# Patient Record
Sex: FEMALE | Race: WHITE
[De-identification: names, ages, dates, MRNs, and addresses within clinical notes are randomized per-mention and may not be internally consistent; named-entity substitution may affect disease eponyms.]

---

## 2017-11-13 NOTE — HP
HISTORY AND PHYSICAL



CHIEF COMPLAINT:

Left shoulder pain.



HISTORY OF PRESENT ILLNESS:

The patient is an 82-year-old retired right-hand dominant female who presents with

progressive left shoulder pain secondary to osteoarthrosis, worsening over the past

several years.  She is having pain with any attempted use and at night.  She has tried

previous injections and medications without significant improvement.



PAST MEDICAL HISTORY:

Significant for diabetes, arthritis, reflux disease.



PAST SURGICAL HISTORY:

Significant for right total hip arthroplasty, lumbar fusion, and tonsillectomy.



CURRENT MEDICATIONS:

Prilosec, thyroxine, atorvastatin.



ALLERGIES:

PENICILLIN.



FAMILY HISTORY:

Significant for heart disease, COPD, and cancer.



SOCIAL HISTORY:

Negative for current tobacco or alcohol use.



REVIEW OF SYSTEMS:

A 16-point review of systems is otherwise reviewed and is noncontributory.



PHYSICAL EXAMINATION:

On examination, the patient is approximately 5 foot 10, 180 pounds, of mesomorphic

habitus.  HEENT exam is nonfocal.  Neck is supple.  On examination of her left

shoulder, she is tender about the anterior subacromial space.  She has moderate

crepitus.  Active range of motion, forward elevation 90 degrees, external rotation with

arm at side 20 degrees, internal rotation to L5.  Motor strength is 5- over 5 for

abduction and external rotation.  Impingement test is positive.  Her distal

neurovascular exam appears intact in the left upper extremity.



DIAGNOSTIC STUDIES:

X-rays to include AP, outlet, and axillary views of the left shoulder obtained in the

office show severe glenohumeral joint osteoarthrosis with maintained humeral head to

acromial distance.



IMPRESSION:

Left severe glenohumeral joint osteoarthrosis.



RECOMMENDATIONS:

I talked to the patient regarding her treatment options.  At this point, she is quite

symptomatic and limited because of pain despite conservative measures.  After thorough

discussion, she opts to proceed with surgery.  We will plan to proceed with left total

shoulder arthroplasty versus reverse total shoulder arthroplasty.  Risks and benefits

were discussed at length in layman's terms.  The patient underwent preoperative medical

evaluation by Dr. Briceno.





MMFRANCOL / SANDRAN: [id number] / Job#: [id number]

## 2017-11-14 ENCOUNTER — HOSPITAL ENCOUNTER (INPATIENT)
Dept: HOSPITAL 47 - 2ORMAIN | Age: 82
LOS: 2 days | Discharge: HOME HEALTH | DRG: 483 | End: 2017-11-16
Payer: MEDICARE

## 2017-11-14 VITALS — BODY MASS INDEX: 24 KG/M2

## 2017-11-14 DIAGNOSIS — E11.9: ICD-10-CM

## 2017-11-14 DIAGNOSIS — Z79.82: ICD-10-CM

## 2017-11-14 DIAGNOSIS — E78.5: ICD-10-CM

## 2017-11-14 DIAGNOSIS — Z79.899: ICD-10-CM

## 2017-11-14 DIAGNOSIS — M19.012: Primary | ICD-10-CM

## 2017-11-14 DIAGNOSIS — Z96.641: ICD-10-CM

## 2017-11-14 DIAGNOSIS — E03.9: ICD-10-CM

## 2017-11-14 DIAGNOSIS — Z82.5: ICD-10-CM

## 2017-11-14 DIAGNOSIS — Z88.0: ICD-10-CM

## 2017-11-14 DIAGNOSIS — I10: ICD-10-CM

## 2017-11-14 DIAGNOSIS — K21.9: ICD-10-CM

## 2017-11-14 LAB — GLUCOSE BLD-MCNC: 92 MG/DL (ref 75–99)

## 2017-11-14 PROCEDURE — 88300 SURGICAL PATH GROSS: CPT

## 2017-11-14 PROCEDURE — 0RRK0JZ REPLACEMENT OF LEFT SHOULDER JOINT WITH SYNTHETIC SUBSTITUTE, OPEN APPROACH: ICD-10-PCS

## 2017-11-14 PROCEDURE — 85025 COMPLETE CBC W/AUTO DIFF WBC: CPT

## 2017-11-14 PROCEDURE — 64415 NJX AA&/STRD BRCH PLXS IMG: CPT

## 2017-11-14 RX ADMIN — HYDROMORPHONE HYDROCHLORIDE ONE MG: 1 INJECTION, SOLUTION INTRAMUSCULAR; INTRAVENOUS; SUBCUTANEOUS at 17:20

## 2017-11-14 RX ADMIN — POTASSIUM CHLORIDE SCH MLS: 14.9 INJECTION, SOLUTION INTRAVENOUS at 09:35

## 2017-11-14 RX ADMIN — HYDROMORPHONE HYDROCHLORIDE ONE MG: 1 INJECTION, SOLUTION INTRAMUSCULAR; INTRAVENOUS; SUBCUTANEOUS at 16:02

## 2017-11-14 RX ADMIN — DEXTROSE SCH MLS/HR: 50 INJECTION, SOLUTION INTRAVENOUS at 19:12

## 2017-11-14 RX ADMIN — POTASSIUM CHLORIDE SCH MLS/HR: 14.9 INJECTION, SOLUTION INTRAVENOUS at 18:14

## 2017-11-14 NOTE — P.ONQ
Anesthesiology Proc Note - PNB





- Peripheral Nerve Block Performed


  ** Left Interscalene Single


Time Out Performed: Yes


Procedure Start Time: 09:42


Indication: Acute Post-Operative Pain, Analgesia


Specifically requested for management of pain by DrAlexis: Momo Engel


Sedation Type: Sedate with meaningful contact maintained


Preparation: Sterile Prep


Position: Supine


Catheter: None


Needle Types: Other (see comment) (Pajunk)


Needle Size: 50mm (2")


Needle Gauge: 21


Technique: Ultrasound


Injectate: Other (see comment) (12.5cc 0.5% ropivicaine + 12.5cc 2% lidocaine)


Adjunct: Epinephrine (see comment for dilution ratio) (1:200,000)


Blood Aspirated: No


Pain Paresthesia on Injection Noted: No


Resistance on Injection: Normal


Events: Uneventful and Well Tolerated

## 2017-11-14 NOTE — P.OP
Date of Procedure: 11/14/17


Preoperative Diagnosis: 


Severe left glenohumeral joint osteoarthrosis-primary


Postoperative Diagnosis: 


Same


Procedure(s) Performed: 


Left total shoulder arthroplasty


Implants: 


[employer] size 10 press-fit humeral stem, 52 mm x 18 mm eccentric humeral 

head, 48 mm pegged cemented glenoid component.


Anesthesia: GETA, regional


Surgeon: Momo Engel


Assistant #1: Mahendra Guzman


Estimated Blood Loss (ml): 100


Pathology: other (Humeral head)


Condition: stable


Disposition: PACU


Indications for Procedure: 


The patient's 82-year-old female who presents with progressive left shoulder 

pain secondary to osteoarthrosis despite extensive conservative measures.  A 

discussion of the risks and benefits of operative intervention versus continued 

conservative measures was made with patient.  She opted to proceed with 

surgery.  Operative risks to include infection, neurovascular injury, 

development blood clots, possible fracture, possible dislocation, possible 

component loosening and failure need for subsequent procedures was discussed.  

Informed consent was obtained.


Operative Findings: 


As below


Description of Procedure: 


The patient was brought to the operating room, and after induction of general 

anesthesia was placed into a beachchair position.  Bony prominences were 

appropriately padded.  The left upper extending was prepped and draped in 

normal fashion.  The bony outlines the acromion, distal clavicle, and coracoid 

process were outlined with a skin marker.  A deltopectoral incision was then 

made lateral to the coracoid process extending approximately 12 cm.  The skin 

was incised sharply.  Subcu tissues were divided bluntly.  Electrocautery was 

used for hemostasis.  The deltopectoral interval was identified and the 

cephalic vein gently retracted laterally with the deltoid.  Subdeltoid 

adhesions were bluntly dissected.  A self-retaining retractor was placed.  The 

upper one third of the pectoralis tendon was released with electrocautery to 

help facilitate exposure.  The clavipectoral fascia was opened and the 

conjoined tendon was gently retracted medially.  The bicipital groove was 

opened and the rotator cuff interval was opened.  The rotator cuff appeared to 

be intact.  Significant joint effusion was noted.  The biceps was tenotomized 

and allowed to retract distally.  The lesser tuberosity was osteotomized with a 

small sagittal saw.  This was tagged with #2 Ethibond.  The capsule was 

released from the anterior humeral neck under direct visualization.  The 

humeral head was fully exposed.  The inferior osteophytes and loose bodies were 

carefully removed.  The head was gently dislocated.  A starting hole was made 

in line with the humeral shaft and bicipital groove.  The canal was then reamed 

by hand up to size 10.  There is good distal fit and chatter.  The cutting 

guide was then placed.  I planned on 30 of retroversion.  I planned on 

resection at the rotator cuff insertion.  The cutting block was pinned in 

place.  The humeral head cut was then made.  The inferior osteophytes were 

carefully removed flush with the native cortical bone.  A protecting plate was 

placed along the cut surface.  Attention was then paid towards preparing the 

glenoid.  Retractors were placed anterior and posteriorly.  The labral tissue 

was debrided from a 6:00 to 12 o'clock position anteriorly.  The posterior 

labrum was also released.  There was adequate glenoid exposure at this point.  

A guidepin was placed in the central portion of the glenoid with the alignment 

guide.  The glenoid was reamed out a bleeding bony surface with a 48 mm reamer.

  It sized most appropriate size 48 mm.  The central pedicle was drilled.  The 

peripheral peg holes were drilled with the alignment guide.  The trial 48 mm 

central peg glenoid was placed and was fully seated.  There was good anterior 

to posterior in addition to a superior to inferior fit.  The trial component 

was removed.  Pulsatile lavage was utilized.  The bony surfaces were then 

dried.  The peripheral peg holes were pressurized with cement and excess cement 

was removed.  The central peg on the 48 mm component was bone grafted.  This 

was then inserted and fully seated.  After the cement had sufficiently hardened

, attention was then paid towards preparing the proximal humerus.  The 

appropriate broach was placed in 30 of retroversion and was fully seated.  

There is good rotational stability.  A trial 52 x 18 mm eccentric head was 

placed in the appropriate alignment.  The shoulder was gently reduced.  It was 

taken through range of motion.  I felt I was stable in flexion and extension 

with internal and external rotation.  The trial components were then removed.  

A #2 Ethibond placed lateral to the bicipital groove for reattachment of the 

lesser tuberosity.  The final size 10 press-fit humeral stem was inserted in 30

 of retroversion and was fully seated.  Again there was good rotational 

stability.  Pulsatile lavage was again utilized.  The 52 mm x 18 mm eccentric 

head was placed in the appropriate rotation to restore humeral height.  This 

was gently impacted.  The shoulder again was reduced.  Again it was stable in 

flexion and extension with internal and external rotation.  I felt there was 

adequate restoration of soft tissue tension.  Pulsatile lavage was again 

utilized.  The rotator interval was closed with #2 Ethibond suture.  The lesser 

tuberosity was reattached with #2 Ethibond suture.  The deltopectoral was 

closed with interrupted 2-0 Vicryl sutures.  The subcu tissues reapproximated 

interrupted 2-0 Vicryl sutures.  The skin was reapproximated with 3-0 

subcuticular Prolene suture.  Mastisol and Steri-Strips were applied.  A 

sterile dressing was applied in addition to a sling.  The patient was awoken 

from general anesthesia and transferred to recovery room in good condition.  

Blood loss was estimated at 100 mL.  No complications were incurred.  Sponge 

and needle counts were correct at the end the case.

## 2017-11-14 NOTE — XR
EXAMINATION TYPE: XR shoulder limited LT

 

DATE OF EXAM: 11/14/2017

 

CLINICAL HISTORY: Status post total left shoulder arthroplasty

 

TECHNIQUE: Single frontal view of left shoulder is obtained.

 

COMPARISON: None. 

 

FINDINGS: Metallic hardware from left shoulder arthroplasty is felt satisfactory position. Subcutaneo
us gas surrounds visualized portion of proximal humerus. Note is made of partial visualization of lef
t midlung opacity could reflect atelectasis and/or infiltrate. This could also reflect fluid tracking
 into fissure.

 

IMPRESSION:: Metallic hardware from left shoulder arthroplasty is satisfactory in position. Left midl
gregorio opacity could reflect pleural effusion or atelectasis and/or infiltrate. Consider chest x-ray cor
relation.

## 2017-11-15 VITALS — RESPIRATION RATE: 16 BRPM

## 2017-11-15 LAB
BASOPHILS # BLD AUTO: 0 K/UL (ref 0–0.2)
BASOPHILS NFR BLD AUTO: 0 %
CH: 30
CHCM: 31.2
EOSINOPHIL # BLD AUTO: 0 K/UL (ref 0–0.7)
EOSINOPHIL NFR BLD AUTO: 0 %
ERYTHROCYTE [DISTWIDTH] IN BLOOD BY AUTOMATED COUNT: 4.12 M/UL (ref 3.8–5.4)
ERYTHROCYTE [DISTWIDTH] IN BLOOD: 13.8 % (ref 11.5–15.5)
HCT VFR BLD AUTO: 39.8 % (ref 34–46)
HDW: 2.25
HGB BLD-MCNC: 12.2 GM/DL (ref 11.4–16)
LUC NFR BLD AUTO: 1 %
LYMPHOCYTES # SPEC AUTO: 0.9 K/UL (ref 1–4.8)
LYMPHOCYTES NFR SPEC AUTO: 8 %
MCH RBC QN AUTO: 29.5 PG (ref 25–35)
MCHC RBC AUTO-ENTMCNC: 30.5 G/DL (ref 31–37)
MCV RBC AUTO: 96.7 FL (ref 80–100)
MONOCYTES # BLD AUTO: 0.8 K/UL (ref 0–1)
MONOCYTES NFR BLD AUTO: 6 %
NEUTROPHILS # BLD AUTO: 10.1 K/UL (ref 1.3–7.7)
NEUTROPHILS NFR BLD AUTO: 85 %
WBC # BLD AUTO: 0.14 10*3/UL
WBC # BLD AUTO: 11.9 K/UL (ref 3.8–10.6)
WBC (PEROX): 12.31

## 2017-11-15 RX ADMIN — PANTOPRAZOLE SODIUM SCH MG: 40 INJECTION, POWDER, FOR SOLUTION INTRAVENOUS at 21:20

## 2017-11-15 RX ADMIN — DEXTROSE SCH: 50 INJECTION, SOLUTION INTRAVENOUS at 03:44

## 2017-11-15 RX ADMIN — THERA TABS SCH: TAB at 15:00

## 2017-11-15 RX ADMIN — CALCIUM CARBONATE (ANTACID) CHEW TAB 500 MG SCH MG: 500 CHEW TAB at 08:18

## 2017-11-15 RX ADMIN — POTASSIUM CHLORIDE SCH: 14.9 INJECTION, SOLUTION INTRAVENOUS at 08:19

## 2017-11-15 RX ADMIN — LEVOTHYROXINE SODIUM SCH MCG: 50 TABLET ORAL at 05:11

## 2017-11-15 NOTE — P.DS
Providers


Date of admission: 


11/14/17 08:55





Expected date of discharge: 11/15/17


Attending physician: 


Momo Engel





Primary care physician: 


Bhavya Colbybagh





[facility] Course: 





Date of admission: 11/14/2017


Date of discharge: 11/15/2017





Admission diagnosis: Status post left total shoulder arthroplasty


Discharge diagnosis: Same





Attending physician: Dr. Engel





Surgical procedures: Left total shoulder arthroplasty





Brief history: Patient is a 82-year-old female with a history of progressive 

primary left shoulder osteoarthritis.  At this point patient has failed 

conservative treatment measures and has opted to proceed with a elective left 

total shoulder arthroplasty.





Hospital course: Details of patient's surgery can be found in operative report.

  Patient tolerated the procedure well and was subsequently transported to 

orthopedic floor.  Patient's orthopeidc and medical care was provided daily. 

Patient had daily laboratory tests performed for evaluation of overall blood 

counts.  Patient had daily physical therapy to include strengthening range of 

motion as well as education with walker ambulation. Patient was treated with 

Lovenox for their postoperative DVT prophylaxis during their inpatient stay.  

Patient was noted to have a relatively uneventful postoperative course.  

Patient reported satisfactory pain control with oral pain medications by 

postoperative day 0.  Patient showed satisfactory progress with physical 

therapy.  Patient moved steadily through the program and had no difficulty 

meeting the goals by postoperative day 0.  Given patient's otherwise 

satisfactory course and having met physical therapy goals, plan is to discharge 

patient home on postoperative day 0.





Discharge condition/disposition: Patient will be discharged home in stable 

condition.





Discharge medications: Instructions are given on resumption of patient's normal 

daily medications per primary care recommendation, in addition patient will be 

prescribed tramadol 50 mg, aspirin 325 mg.





Discharge instructions:


1.  Wound care and infection precautions, keep incision dry and covered while 

showering, no lotions, creams, moisturizers. No soaking, tubs, pools, hottubs. 

Do not scrub over the incision.


2.  Utilize arm sling, pendular exercises 2-3 times a day


3.  Ice and elevate when necessary.  Do not exceed 20 minutes per hour with ice 

pack.


4.  Utilize compression sleeve until seen at first follow up appointment.


5.  Visiting nursing care.


6.  Pain meds and anticoagulants per prescription.


7.  Pain medication has potential to cause constipation. Increase oral fluid 

and fiber intake. Contact primary care provider if you have not had a bowel 

movement within 48 hours after discharge


8.  No anti-inflammatory medication until discussed at first post operative 

visit, this including Motrin, Aleve, Mobic, Diclofenac


9.  Follow up in office at 2 weeks postop with Benja Guzman PA-C


10. Follow up with your primary care doctor 7-10 days after discharge.


11. Contact [facility] with any questions, [phone].








Procedures: 





Left total shoulder arthroplasty


Patient Condition at Discharge: Good





Plan - Discharge Summary


Discharge Rx Participant: Yes


New Discharge Prescriptions: 


New


   Aspirin 325 mg PO BID #60 tab


   traMADol HCl [Ultram] 50 mg PO Q6H PRN #40 tab


     PRN Reason: Pain





No Action


   Omeprazole [PriLOSEC] 20 mg PO AC-BRKFST


   Levothyroxine Sodium [Synthroid] 50 mcg PO DAILY


   Atorvastatin [Lipitor] 20 mg PO HS


   Multivitamins, Thera [Multivitamin (formulary)] 1 tab PO DAILY


   Omega-3   1 cap PO DAILY


   Calcium Carbonate [Calcium] 1,200 mg PO DAILY


   Randolph 3,250 tab PO DAILY


Discharge Medication List





Randolph 3,250 tab PO DAILY 11/08/17 [History]


Atorvastatin [Lipitor] 20 mg PO HS 11/08/17 [History]


Calcium Carbonate [Calcium] 1,200 mg PO DAILY 11/08/17 [History]


Levothyroxine Sodium [Synthroid] 50 mcg PO DAILY 11/08/17 [History]


Multivitamins, Thera [Multivitamin (formulary)] 1 tab PO DAILY 11/08/17 [History

]


Omega-3   1 cap PO DAILY 11/08/17 [History]


Omeprazole [PriLOSEC] 20 mg PO AC-BRKFST 11/08/17 [History]


Aspirin 325 mg PO BID #60 tab 11/15/17 [Rx]


traMADol HCl [Ultram] 50 mg PO Q6H PRN #40 tab 11/15/17 [Rx]








Follow up Appointment(s)/Referral(s): 


[facility], [NON-STAFF] - 


Mahendra Guzman PAC [PHYSICIAN ASSISTANT] - 2 Weeks


Activity/Diet/Wound Care/Special Instructions: 


Orthopedic Discharge Instructions:


1.  Wound care and infection precautions, [keep incision dry and covered while 

showering], no lotions, creams, moisturizers. No soaking, pools, hot tubs. Do 

not scrub over incision.


2.  Utilize arm sling, pendular exercises 2-3 times a day with


3.  Ice and elevate when necessary.  Do not exceed 20 minutes per hour with ice 

pack.


4.  Utilize compression sleeve until seen at first follow up appointment.


5.  Visiting nursing care.


6.  Pain meds and anticoagulants per prescription.


7.  Pain medication has potential to cause constipation. Increase oral fluid 

and fiber intake. Contact primary care provider if you have not had a bowel 

movement within 48 hours after discharge.


8.  No anti-inflammatory medication until discussed at first post operative 

visit, this including Motrin, Aleve, Mobic, Diclofenac.


9. Follow up in office at 2 weeks postop with Benja Guzman PA-C


10. Follow up with your primary care doctor 7-10 days after discharge.


11. Contact [facility] with any questions, [phone].


Discharge Disposition: HOME WITH HOME HEALTH SERVICES

## 2017-11-15 NOTE — P.PN
Subjective


Progress Note Date: 11/15/17


Principal diagnosis: 





Status post left total shoulder arthroplasty





Patient seen today resting in her hospital bed, he is ecchymosis present at 

bedside.  Patient is doing very well, her pain is controlled.  She denies any 

headaches, lightheadedness, chest pain or shortness of breath.





Objective





- Vital Signs


Vital signs: 


 Vital Signs











Temp  97.5 F L  11/15/17 07:00


 


Pulse  69   11/15/17 08:00


 


Resp  17   11/15/17 08:00


 


BP  139/82   11/15/17 07:00


 


Pulse Ox  93 L  11/15/17 07:00








 Intake & Output











 11/14/17 11/15/17 11/15/17





 18:59 06:59 18:59


 


Intake Total 1807 450 


 


Output Total 400 400 300


 


Balance 1407 50 -300


 


Weight 77.111 kg  


 


Intake:   


 


  IV 1807  


 


  Intake, IV Titration  450 





  Amount   


 


    Clindamycin 900 mg In  50 





    Dextrose 5% in Water 50   





    ml @ 100 mls/hr IVPB Q8H   





    DAVIS Rx#:[id number]   


 


    Lactated Ringers 1,000 ml  400 





    @ 50 mls/hr IV .Q20H DAVIS   





    Rx#:[id number]   


 


Output:   


 


  Urine 300 400 300


 


    Uretheral ([place])   300


 


  Estimated Blood Loss 100  


 


Other:   


 


  Voiding Method Indwelling Catheter Indwelling Catheter Indwelling Catheter














- Exam





Left upper extremity:


Incision is clean, dry, and intact.  Sensory exam to light touch throughout the 

extremities intact.  She is able to wiggle all the fingers, flexion and 

extension and the wrist are intact. 





- Labs


CBC & Chem 7: 


 11/15/17 06:13





Labs: 


 Abnormal Lab Results - Last 24 Hours (Table)











  11/15/17 Range/Units





  06:13 


 


WBC  11.9 H  (3.8-10.6)  k/uL


 


MCHC  30.5 L  (31.0-37.0)  g/dL


 


Neutrophils #  10.1 H  (1.3-7.7)  k/uL


 


Lymphocytes #  0.9 L  (1.0-4.8)  k/uL














Assessment and Plan


Plan: 





Assessment:


1.  Postop day #1 status post left total shoulder arthroplasty





Plan:


1.  Pain control, continue supportive oral medication


2.  GI and DVT prophylaxis, aspirin 325 mg twice a day


3.  Wound care instructions were discussed with patient


4.  Pendular exercises at home


5.  Medical recommendations


6.  Discharge planning: Patient will be discharged home today


Time with Patient: Less than 30
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
## 2017-11-16 VITALS — DIASTOLIC BLOOD PRESSURE: 71 MMHG | TEMPERATURE: 98.4 F | SYSTOLIC BLOOD PRESSURE: 127 MMHG

## 2017-11-16 VITALS — HEART RATE: 69 BPM

## 2017-11-16 LAB
BASOPHILS # BLD AUTO: 0 K/UL (ref 0–0.2)
BASOPHILS NFR BLD AUTO: 0 %
CH: 29
CHCM: 31.1
EOSINOPHIL # BLD AUTO: 0.1 K/UL (ref 0–0.7)
EOSINOPHIL NFR BLD AUTO: 1 %
ERYTHROCYTE [DISTWIDTH] IN BLOOD BY AUTOMATED COUNT: 3.56 M/UL (ref 3.8–5.4)
ERYTHROCYTE [DISTWIDTH] IN BLOOD: 14.8 % (ref 11.5–15.5)
HCT VFR BLD AUTO: 33.4 % (ref 34–46)
HDW: 2.27
HGB BLD-MCNC: 10.8 GM/DL (ref 11.4–16)
LUC NFR BLD AUTO: 1 %
LYMPHOCYTES # SPEC AUTO: 1.2 K/UL (ref 1–4.8)
LYMPHOCYTES NFR SPEC AUTO: 12 %
MCH RBC QN AUTO: 30.5 PG (ref 25–35)
MCHC RBC AUTO-ENTMCNC: 32.4 G/DL (ref 31–37)
MCV RBC AUTO: 94 FL (ref 80–100)
MONOCYTES # BLD AUTO: 0.9 K/UL (ref 0–1)
MONOCYTES NFR BLD AUTO: 9 %
NEUTROPHILS # BLD AUTO: 7.4 K/UL (ref 1.3–7.7)
NEUTROPHILS NFR BLD AUTO: 77 %
WBC # BLD AUTO: 0.13 10*3/UL
WBC # BLD AUTO: 9.7 K/UL (ref 3.8–10.6)
WBC (PEROX): 10.22

## 2017-11-16 RX ADMIN — THERA TABS SCH: TAB at 13:53

## 2017-11-16 RX ADMIN — CALCIUM CARBONATE (ANTACID) CHEW TAB 500 MG SCH: 500 CHEW TAB at 09:37

## 2017-11-16 RX ADMIN — LEVOTHYROXINE SODIUM SCH: 50 TABLET ORAL at 04:37

## 2017-11-16 RX ADMIN — PANTOPRAZOLE SODIUM SCH MG: 40 INJECTION, POWDER, FOR SOLUTION INTRAVENOUS at 08:43

## 2017-11-16 RX ADMIN — POTASSIUM CHLORIDE SCH: 14.9 INJECTION, SOLUTION INTRAVENOUS at 04:36

## 2017-11-16 NOTE — P.PN
Subjective


Progress Note Date: 11/16/17


Principal diagnosis: 





Status post left total shoulder arthroplasty





Patient seen today resting in her hospital bed, she appears comfortable.  

Patient did stay last night due to concerns from internal medicine and GI 

regarding the morning episode of emesis with dark material.  They mention if 

she had no further episodes she will be discharged home today.  Patient states 

that she feels well at this time.





Objective





- Vital Signs


Vital signs: 


 Vital Signs











Temp  98.4 F   11/16/17 00:00


 


Pulse  69   11/16/17 08:00


 


Resp  16   11/16/17 08:00


 


BP  127/71   11/16/17 00:00


 


Pulse Ox  92 L  11/16/17 00:00








 Intake & Output











 11/15/17 11/16/17 11/16/17





 18:59 06:59 18:59


 


Output Total 500  


 


Balance -500  


 


Weight 77.111 kg  77.111 kg


 


Output:   


 


  Urine 300  


 


    Uretheral ([place]) 300  


 


  Emesis 200  


 


Other:   


 


  Voiding Method Indwelling Catheter  Toilet


 


  # Voids 2 4 


 


  # Bowel Movements  1 1














- Exam





Left upper extremity:


Incision is clean, dry, and intact.  Sensory exam to light touch throughout the 

extremities intact.  She is able to wiggle all the fingers, flexion and 

extension and the wrist are intact. 





- Labs


CBC & Chem 7: 


 11/16/17 06:35





Labs: 


 Abnormal Lab Results - Last 24 Hours (Table)











  11/16/17 Range/Units





  06:35 


 


RBC  3.56 L  (3.80-5.40)  m/uL


 


Hgb  10.8 L  (11.4-16.0)  gm/dL


 


Hct  33.4 L  (34.0-46.0)  %














Assessment and Plan


Plan: 





Assessment:


1.  Postop day #2 status post left total shoulder arthroplasty





Plan:


1.  Pain control, continue supportive oral medication


2.  GI and DVT prophylaxis, aspirin 325 mg twice a day


3.  Wound care instructions were discussed with patient


4.  Pendular exercises at home


5.  Medical recommendations


6.  Discharge planning: Patient will be discharged home today


Time with Patient: Less than 30

## 2017-11-16 NOTE — P.PN
Subjective


Progress Note Date: 11/16/17





Patient is status post left total shoulder arthroplasty.  She was initially was 

be discharged yesterday however, she had 2 episodes of vomiting black material.

  Patient was placed on Protonix and aspirin was held.  GI service was 

consulted.  Hemoglobin has dropped from 12.2-10.8.  Patient has been given 

regular breakfast this morning.  She's had no further episodes of vomiting.  

And if there is no recurrence of vomiting per nursing staff GI service 

recommended the patient could be discharged home if she tolerates diet.  

Patient is passing gas.  No abdominal pain.  No bowel movement.  No further 

nausea and vomiting.  No difficulty urinating.  Pain is controlled the left 

shoulder.





Objective





- Vital Signs


Vital signs: 


 Vital Signs











Temp  98.4 F   11/16/17 00:00


 


Pulse  69   11/16/17 08:00


 


Resp  16   11/16/17 08:00


 


BP  127/71   11/16/17 00:00


 


Pulse Ox  92 L  11/16/17 00:00








 Intake & Output











 11/15/17 11/16/17 11/16/17





 18:59 06:59 18:59


 


Output Total 500  


 


Balance -500  


 


Weight 77.111 kg  77.111 kg


 


Output:   


 


  Urine 300  


 


    Uretheral ([place]) 300  


 


  Emesis 200  


 


Other:   


 


  Voiding Method Indwelling Catheter  Toilet


 


  # Voids 2 4 


 


  # Bowel Movements  1 1














- Exam





Head normocephalic


Neck supple


Lungs clear to auscultation bilaterally no wheezing or crackles


Heart regular rate and rhythm S1-S2, no rub or gallop


Abdomen is soft nontender nondistended positive bowel sounds no 

hepatosplenomegaly


Extremities no edema.  Left arm dressing clean dry and it tapped for the left 

shoulder.  Arm is in sling.  +2 radial pulse.


Neuro alert and orientated to 3





- Labs


CBC & Chem 7: 


 11/16/17 06:35





Labs: 


 Abnormal Lab Results - Last 24 Hours (Table)











  11/16/17 Range/Units





  06:35 


 


RBC  3.56 L  (3.80-5.40)  m/uL


 


Hgb  10.8 L  (11.4-16.0)  gm/dL


 


Hct  33.4 L  (34.0-46.0)  %














Assessment and Plan


Assessment: 





#1 left shoulder osteoarthritis status post total shoulder arthroplasty.


#2 episodes of vomiting of black material twice today, at this time will hold 

Lovenox withhold ASA Will consult gastroenterology for possible EGD if those 

episodes continue, patient was placed on Protonix 40 mg IV twice daily.  

Patient evaluated by GI service.  The plan is to see if patient tolerates diet 

before discharge home.  At this point there is no EGD scheduled


#3 underlying history of hypothyroidism continue with Synthroid 50 g daily


#4 underlying history of hyperlipidemia maintained on Lipitor continue


Will follow during this admission for medical management





Anticipating discharge home if patient tolerates diet without any further 

episodes of vomiting and is cleared by GI service.





I performed an examination of the patient and discussed their management with 

the physician Assistant.  I have reviewed the Physician Assistant's notes and 

agree with the documented findings and plan of care

## 2017-11-16 NOTE — CONS
CONSULTATION



DATE OF SERVICE:

11/16/2017



REASON FOR CONSULTATION:

Coffee-grounds emesis.



HISTORY OF PRESENT ILLNESS:

The patient is an 82-year-old pleasant white female who underwent an elective left

shoulder surgery 2 days ago and following the surgery, had some epigastric discomfort

and had two episodes of coffee-grounds emesis.  We are hence consulted in regards to

this issue.  Her surgery was 2 days ago and she had two episodes of emesis yesterday.

Overnight, she did not have any further episodes.  This morning she is feeling good.

No abdominal pain.  No nausea, vomiting.  She denies any prior history of peptic ulcer

disease.  No recent NSAID use.  She states she is hungry and wants to eat now.



PAST MEDICAL HISTORY:

Significant for GERD, hypertension, hyperlipidemia, hypothyroidism.



PAST SURGICAL HISTORY:

Recent left shoulder surgery 2 days ago, history of total right hip, lesion of the

back.



MEDICATIONS:

At home include multivitamin, Synthroid, Lipitor, calcium, tramadol, aspirin, Prilosec.



SOCIAL HISTORY:

No smoking or alcohol use.



ALLERGIES:

HYDROCODONE, PENICILLIN, LATEX.



FAMILY HISTORY:

Brother has DVT.  Sister had some kind of a cancer.



REVIEW OF SYSTEMS:

CARDIOPULMONARY:  No chest pain, shortness of breath.

GENITOURINARY:  No dysuria or hematuria.

MUSCULOSKELETAL:  Unremarkable.

SKIN:  Unremarkable.

ENDOCRINE: Unremarkable.

PSYCHIATRIC:  Unremarkable.

NEUROLOGY:  Unremarkable.

ENT:  Vision unremarkable.

CONSTITUTIONAL:  No recent weight loss.  No fever, chills, night sweats.



PHYSICAL EXAMINATION:

She appears comfortable, in no apparent distress.

Vital signs are stable.  Blood pressure is 127/71, pulse 86, temperature 98.4.

HEENT examination is remarkable, conjunctivae or pink, sclera nonicteric. Oral cavity

no lesions.

NECK:  No JVD or lymph node enlargement.  Chest was clear to auscultation.

HEART:  Regular rate and rhythm.

Abdomen is soft.  Bowel sounds are positive.  No organomegaly.

EXTREMITIES:  No pedal edema.  Left shoulder in cast.

NEURO: Alert and oriented x3.  No focal deficits.



LABS:

From yesterday, hemoglobin was 12.2, this morning is 10.8.  WBC 11.9 and platelets are

normal.



IMPRESSION:

This is a lady who presents to the hospital for an elective left shoulder surgery,

which was done 2 days ago and yesterday had some epigastric discomfort and had a small

amount of coffee-grounds emesis x1.  Hemoglobin remained stable.  She presently is

symptom-free for the last 24 hours.  No prior history of peptic ulcer disease,

presently hemodynamically stable and no active bleeding.



RECOMMENDATIONS:

1. Continue with Protonix 40 mg q.12 hours.

2. Advance diet as tolerated.

3. If the patient is doing well, she can be discharged home with outpatient and

    continue with Protonix 40 mg twice daily for 1 month and then decrease it to once

    daily.  If she has further symptoms, she can follow up in the office.

Thank you for this consultation.





MMFRANCOL / SANDRAN: [id number] / Job#: [id number]

## 2020-06-30 ENCOUNTER — HOSPITAL ENCOUNTER (OUTPATIENT)
Dept: HOSPITAL 47 - LABPAT | Age: 85
Discharge: HOME | End: 2020-06-30
Attending: ORTHOPAEDIC SURGERY
Payer: MEDICARE

## 2020-06-30 DIAGNOSIS — Z01.812: Primary | ICD-10-CM

## 2020-06-30 PROCEDURE — 87070 CULTURE OTHR SPECIMN AEROBIC: CPT

## 2020-07-06 NOTE — HP
HISTORY AND PHYSICAL



CHIEF COMPLAINT:

Right shoulder pain.



HISTORY OF PRESENT ILLNESS:

Patient is an 85-year-old, right-hand dominant, retired female who presents with

progressive right shoulder pain worsening over the past 1-2 years.  She notes

significant stiffness along with pain with any attempted use.  She is also having

significant night symptoms.  She has tried medications in addition to therapy and

injections without much relief.



PAST MEDICAL HISTORY:

Significant for reflux disease, hypothyroidism, and type 2 diabetes along with

arthritis.



PAST SURGICAL HISTORY:

Significant for spinal fusion, total hip arthroplasty, tonsillectomy, and left total

shoulder arthroplasty.



CURRENT MEDICATIONS:

Levothyroxine and omeprazole.



She notes allergies to PENICILLIN.



FAMILY HISTORY:

Significant for cancer and heart disease.



SOCIAL HISTORY:

Negative for current tobacco or alcohol use.



16 POINT REVIEW OF SYSTEMS:

Otherwise reviewed and is noncontributory.



PHYSICAL EXAMINATION:

On examination, the patient is approximately 5 foot 10, 180 pounds of mesomorphic

habitus.

HEENT:  Exam is nonfocal.  Neck is supple.



On examination of her right shoulder, she is tender about the anterior subacromial

space.  She has moderate subacromial crepitus.  Active range of motion, forward

elevation 60 degrees, external rotation with arms at side 15 degrees, internal rotation

to S1.  Motor strength is 5/5 for abduction and external rotation.  Passively, I am

able to forward elevate her to 110 degrees.  Impingement test, NEER test, and Speed

test are positive.  Her distal neurovascular exam appears intact otherwise in the right

upper extremity.



Three views of the right shoulder obtained in the office show severe glenohumeral joint

osteoarthrosis with bone-on-bone changes.  There is subchondral sclerosis.



IMPRESSION:

Right severe glenohumeral joint osteoarthrosis-symptomatic.



RECOMMENDATIONS:

I talked to the patient at length regarding her condition and treatment options.  At

this point, she is quite symptomatic and opts to proceed with surgery.  Will plan to

proceed with right total shoulder arthroplasty.  Risks and benefits were discussed at

length in layman's terms.  We will likely keep the patient for a 23-hour hold

postoperatively.





MADINA / SANDRAN: [id number] / Job#: [id number]

## 2020-07-07 ENCOUNTER — HOSPITAL ENCOUNTER (INPATIENT)
Dept: HOSPITAL 47 - 2ORMAIN | Age: 85
LOS: 1 days | Discharge: HOME HEALTH | DRG: 483 | End: 2020-07-08
Attending: ORTHOPAEDIC SURGERY | Admitting: ORTHOPAEDIC SURGERY
Payer: MEDICARE

## 2020-07-07 VITALS — BODY MASS INDEX: 23.7 KG/M2

## 2020-07-07 DIAGNOSIS — Z88.5: ICD-10-CM

## 2020-07-07 DIAGNOSIS — Z96.612: ICD-10-CM

## 2020-07-07 DIAGNOSIS — E11.9: ICD-10-CM

## 2020-07-07 DIAGNOSIS — Z80.9: ICD-10-CM

## 2020-07-07 DIAGNOSIS — Z98.890: ICD-10-CM

## 2020-07-07 DIAGNOSIS — Z88.0: ICD-10-CM

## 2020-07-07 DIAGNOSIS — Z91.040: ICD-10-CM

## 2020-07-07 DIAGNOSIS — Z79.890: ICD-10-CM

## 2020-07-07 DIAGNOSIS — Z90.89: ICD-10-CM

## 2020-07-07 DIAGNOSIS — E78.5: ICD-10-CM

## 2020-07-07 DIAGNOSIS — E03.9: ICD-10-CM

## 2020-07-07 DIAGNOSIS — Z98.1: ICD-10-CM

## 2020-07-07 DIAGNOSIS — K21.9: ICD-10-CM

## 2020-07-07 DIAGNOSIS — Z96.641: ICD-10-CM

## 2020-07-07 DIAGNOSIS — Z82.49: ICD-10-CM

## 2020-07-07 DIAGNOSIS — M19.011: Primary | ICD-10-CM

## 2020-07-07 LAB
GLUCOSE BLD-MCNC: 142 MG/DL (ref 75–99)
GLUCOSE BLD-MCNC: 92 MG/DL (ref 75–99)

## 2020-07-07 PROCEDURE — 94760 N-INVAS EAR/PLS OXIMETRY 1: CPT

## 2020-07-07 PROCEDURE — 85025 COMPLETE CBC W/AUTO DIFF WBC: CPT

## 2020-07-07 PROCEDURE — 0RRJ0JZ REPLACEMENT OF RIGHT SHOULDER JOINT WITH SYNTHETIC SUBSTITUTE, OPEN APPROACH: ICD-10-PCS

## 2020-07-07 PROCEDURE — 88300 SURGICAL PATH GROSS: CPT

## 2020-07-07 PROCEDURE — 64415 NJX AA&/STRD BRCH PLXS IMG: CPT

## 2020-07-07 PROCEDURE — 84132 ASSAY OF SERUM POTASSIUM: CPT

## 2020-07-07 PROCEDURE — 76942 ECHO GUIDE FOR BIOPSY: CPT

## 2020-07-07 RX ADMIN — POTASSIUM CHLORIDE SCH MLS: 14.9 INJECTION, SOLUTION INTRAVENOUS at 12:56

## 2020-07-07 RX ADMIN — HYDROMORPHONE HYDROCHLORIDE PRN MG: 1 INJECTION, SOLUTION INTRAMUSCULAR; INTRAVENOUS; SUBCUTANEOUS at 15:54

## 2020-07-07 RX ADMIN — HYDROMORPHONE HYDROCHLORIDE PRN MG: 1 INJECTION, SOLUTION INTRAMUSCULAR; INTRAVENOUS; SUBCUTANEOUS at 16:26

## 2020-07-07 NOTE — P.ANPRN
Procedure Note - Anesthesia





- Nerve Block Performed


  ** Right Interscalene Single


Time Out Performed: Yes ([id number])


Date of Procedure: 07/07/20


Procedure Start Time: 16:00


Procedure Stop Time: 16:09


Location of Patient: PreOp


Indication: Acute Post-Operative Pain, Requested by Surgeon


Specifically requested for management of pain by : Momo Engel


Sedation Type: Sedate with meaningful contact maintained


Preparation: Sterile Prep


Position: Supine


Catheter: None


Needle Types: Pajunk


Needle Gauge: 21


Ultrasound used to visualize needle placement: Yes


Ultrasound used to observe medication spread: Yes


Injectate: 0.5% Ropivacaine (see comment for volume) (20cc)


Blood Aspirated: No


Pain Paresthesia on Injection Noted: No


Resistance on Injection: Normal


Image Stored and Saved: Yes


Events: Uneventful and Well Tolerated

## 2020-07-07 NOTE — P.OP
Date of Procedure: 07/07/20


Preoperative Diagnosis: 


Right severe glenohumeral joint osteoarthrosis


Postoperative Diagnosis: 


Same


Procedure(s) Performed: 


Right total shoulder arthroplasty


Implants: 


[employer] size 14 press-fit humeral stem was size 14 body, 52 x 18 mm 

eccentric humeral head, 48 mm cemented pegged glenoid component.


Anesthesia: MARIELOS


Surgeon: Momo Engel


Assistant #1: Mahendra Guzman


Estimated Blood Loss (ml): 150


Pathology: other (Humeral head)


Condition: stable


Disposition: PACU


Indications for Procedure: 


The patient's an 85-year-old female who presents with progressive right shoulder

pain secondary osteoarthrosis despite conservative measures.  A discussion of 

the risks and benefits of operative intervention versus continued conservative 

measures was made with the patient.  She opted to proceed with surgery.  

Operative risks to include infection, neurovascular injury, development of blood

clots, possible fracture, possible component loosening and need for subsequent 

procedures was discussed.  Informed consent was obtained.


Operative Findings: 


As below


Description of Procedure: 


The patient was brought to the operating room, and after induction of general 

anesthesia was placed in the beachchair position.  The bony prominences were 

appropriately padded.  The right upper extremity was prepped and draped in 

normal fashion.  A deltopectoral incision was then made lateral to the coracoid 

process extending approximately 12 cm.  The skin was incised sharply.  

Subcutaneous tissues were divided bluntly.  Electrocautery was used for 

hemostasis.  The deltopectoral interval was identified and the cephalic vein 

gently retracted laterally with the deltoid.  Subdeltoid adhesions were bluntly 

dissected.  A self-retaining retractor was placed.  The clavipectoral fascia was

opened and the conjoined tendon gently retracted medially.  The upper one third 

of the pectoralis major was released to help facilitate exposure.  The biceps 

was identified and the sheath was opened.  The rotator interval was opened.  The

biceps was tenotomized and allowed to retract distally.  The lesser tuberosity 

osteotomy was performed with a small sagittal saw.  This completed with an 

osteotome.  The humeral head was then exposed releasing the capsule off the 

humeral neck.  The shoulder was gently dislocated.  A starting hole was made in 

the head in line with the humeral shaft.  The shaft was reamed by hand up to 14 

mm.  There is good distal chatter.  The cutting guide was placed planning on 8 

cut flush with the rotator cuff insertion and 30 of retroversion.  The cutting 

block was pinned in place.  The humeral head cut was then made.  This measured 

most appropriately at 52 x 18 mm.  Residual inferior osteophytes were carefully 

removed flush with the native cortical bone.  A posterior glenoid retractor was 

placed.  The glenoid was then exposed releasing the labrum from the 12 to 6  

o'clock position.  Residual labral tissue was removed.  The glenoid sized most 

appropriate [] mm.  A guidewire was then inserted planning on the appropriate 

version.  The glenoid was reamed down to a bleeding bony surface.  The central 

peg hole was drilled.  The alignment guide was placed in the peripheral peg 

holes drilled.  The trial size 48 mm glenoid was placed and was fully seated.  

There was good anterior to posterior and inferior to superior fit.  The trial 

component was removed.  Pulsatile lavage was utilized.  The bony surface was 

dried.  The peripheral peg holes were then pressurized with cement utilizing a 

syringe.  Excess cement was removed.  A central peg glenoid was then placed and 

was fully seated.  This was gently impacted.  This was held in place until the 

cement had sufficiently hardened.  Attention was then paid again towards 

preparing the proximal humerus.  The appropriate broach was placed in 30 of 

retroversion and was fully seated.  An eccentric 52 x 18 mm humeral head was 

placed.  The shoulder was gently reduced.  It was taken through a range of m

otion.  It was felt to be stable in flexion and extension with internal and 

external rotation.  I felt there was adequate restoration of soft tissue 

tension.  The shoulder was gently dislocated.  The trial components were then 

removed.  A #2 Ethibond was placed laterally for reattachment of the lesser 

tuberosity.  The humeral stem was inserted in 30 of retroversion and was fully 

seated.  There was good rotational stability.  The eccentric 52 x 18 mm humeral 

head was gently impacted.  The shoulder was then gently reduced and taken 

through range of motion and was felt to be stable.  Pulsatile lavage was 

utilized.  Lesser tuberosity was reattached utilizing #2 Ethibond suture.  The 

rotator interval was closed with #2 Ethibond suture.  She had minimal drainage 

at this point therefore a deep drain was not placed.  The deltopectoral interval

 was closed with interrupted 2-0 Vicryl sutures.  The subcu tissues were 

reapproximated with interrupted 2-0 Vicryl sutures.  The skin was reprepped with

 3-0 subcuticular Prolene suture.  Steri-Strips were applied.  A sterile 

dressing was applied in addition to a sling.  The patient was then awoken from 

general anesthesia and transferred to recovery room in good condition.  Blood 

loss was estimated at 150 mL.  No complications were incurred.  Sponge and 

needle counts were correct at the end the case.  Benja JACKSON assisted during 

the major components the case to include exposure, resection, implantation, and 

closure.
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
## 2020-07-07 NOTE — XR
EXAMINATION TYPE: XR shoulder limited RT

 

DATE OF EXAM: 7/7/2020

 

COMPARISON: 6/8/2020

 

HISTORY: Surgery

 

TECHNIQUE: Single view

 

FINDINGS: There is a right shoulder prosthesis. Components are in anatomic position.

 

IMPRESSION: No complicating process seen.

## 2020-07-08 LAB
BASOPHILS # BLD AUTO: 0 K/UL (ref 0–0.2)
BASOPHILS NFR BLD AUTO: 0 %
EOSINOPHIL # BLD AUTO: 0 K/UL (ref 0–0.7)
EOSINOPHIL NFR BLD AUTO: 0 %
ERYTHROCYTE [DISTWIDTH] IN BLOOD BY AUTOMATED COUNT: 3.74 M/UL (ref 3.8–5.4)
ERYTHROCYTE [DISTWIDTH] IN BLOOD: 15 % (ref 11.5–15.5)
GLUCOSE BLD-MCNC: 120 MG/DL (ref 75–99)
HCT VFR BLD AUTO: 33.4 % (ref 34–46)
HGB BLD-MCNC: 10.7 GM/DL (ref 11.4–16)
LYMPHOCYTES # SPEC AUTO: 1.4 K/UL (ref 1–4.8)
LYMPHOCYTES NFR SPEC AUTO: 12 %
MCH RBC QN AUTO: 28.7 PG (ref 25–35)
MCHC RBC AUTO-ENTMCNC: 32.1 G/DL (ref 31–37)
MCV RBC AUTO: 89.5 FL (ref 80–100)
MONOCYTES # BLD AUTO: 1 K/UL (ref 0–1)
MONOCYTES NFR BLD AUTO: 9 %
NEUTROPHILS # BLD AUTO: 9.4 K/UL (ref 1.3–7.7)
NEUTROPHILS NFR BLD AUTO: 78 %
PLATELET # BLD AUTO: 271 K/UL (ref 150–450)
WBC # BLD AUTO: 12.1 K/UL (ref 3.8–10.6)

## 2020-07-08 RX ADMIN — POTASSIUM CHLORIDE SCH: 14.9 INJECTION, SOLUTION INTRAVENOUS at 07:57

## 2020-07-08 NOTE — P.PN
Subjective


Progress Note Date: 07/08/20


Principal diagnosis: 





Status post right total shoulder arthroplasty





Patient evaluated today at bedside, she is resting comfortably.  Pain is well-

controlled.  She denies any chest pain or shortness of breath.





Objective





- Vital Signs


Vital signs: 


                                   Vital Signs











Temp  98.4 F   07/08/20 07:00


 


Pulse  84   07/08/20 07:00


 


Resp  16   07/08/20 07:00


 


BP  132/77   07/08/20 07:00


 


Pulse Ox  92 L  07/08/20 07:00








                                 Intake & Output











 07/07/20 07/08/20 07/08/20





 18:59 06:59 18:59


 


Intake Total 1401 1265 240


 


Output Total 150  


 


Balance 1251 1265 240


 


Weight 79.6 kg  


 


Intake:   


 


  IV 1401  


 


  Intake, IV Titration  825 





  Amount   


 


    Lactated Ringers 1,000 ml  725 





    @ 0 mls/hr IV .[facility]   





    ONE Rx#:[id number]   


 


    ceFAZolin 2 gm In Sodium  100 





    Chloride 0.9% 50 ml @ 100   





    mls/hr IVPB Q8H [facility] Rx#:   





    [id number]   


 


  Oral  440 240


 


Output:   


 


  Estimated Blood Loss 150  


 


Other:   


 


  Voiding Method  Bedside Commode Bedside Commode


 


  # Voids  2 














- Exam





Right upper extremity:





Postoperative bandages were removed, incision is clean, dry and intact.  Obvious

soft tissue swelling and ecchymosis present in the arm.  Sensory exam light 

touch is intact throughout the extremity, radial pulses 2+.





- Labs


CBC & Chem 7: 


                                 07/08/20 09:15





                                 07/07/20 12:54


Labs: 


                  Abnormal Lab Results - Last 24 Hours (Table)











  07/07/20 07/08/20 07/08/20 Range/Units





  21:01 07:16 09:15 


 


WBC    12.1 H  (3.8-10.6)  k/uL


 


RBC    3.74 L  (3.80-5.40)  m/uL


 


Hgb    10.7 L  (11.4-16.0)  gm/dL


 


Hct    33.4 L  (34.0-46.0)  %


 


Neutrophils #    9.4 H  (1.3-7.7)  k/uL


 


POC Glucose (mg/dL)  142 H  120 H   (75-99)  mg/dL














Assessment and Plan


Assessment: 





Status post right total shoulder arthroplasty


Plan: 





Pain control, plan for discharge, tramadol 50 mg





GI and DVT prophylaxis, aspirin 81 mg twice a day





Sling instructions were discussed along with activity restrictions





Wound care instructions discussed





Medical recommendations





Plan for discharge home today


Time with Patient: Less than 30

## 2020-07-08 NOTE — P.PN
Subjective


Progress Note Date: 07/08/20


Principal diagnosis: 





Medical management





Patient was seen and examined.  No acute events overnight.  Patient reports 

bruising around her left shoulder.  She reports resolution of her numbness.  She

denies any chest pain, shortness breath or palpitations.  No nausea or vomiting.

 No fever or chills.





Objective





- Vital Signs


Vital signs: 


                                   Vital Signs











Temp  98.4 F   07/08/20 07:00


 


Pulse  84   07/08/20 07:00


 


Resp  16   07/08/20 07:00


 


BP  132/77   07/08/20 07:00


 


Pulse Ox  92 L  07/08/20 07:00








                                 Intake & Output











 07/07/20 07/08/20 07/08/20





 18:59 06:59 18:59


 


Intake Total 1401 1265 240


 


Output Total 150  


 


Balance 1251 1265 240


 


Weight 79.6 kg  


 


Intake:   


 


  IV 1401  


 


  Intake, IV Titration  825 





  Amount   


 


    Lactated Ringers 1,000 ml  725 





    @ 0 mls/hr IV .[facility]   





    ONE Rx#:[id number]   


 


    ceFAZolin 2 gm In Sodium  100 





    Chloride 0.9% 50 ml @ 100   





    mls/hr IVPB Q8H [facility] Rx#:   





    [id number]   


 


  Oral  440 240


 


Output:   


 


  Estimated Blood Loss 150  


 


Other:   


 


  Voiding Method  Bedside Commode Bedside Commode


 


  # Voids  2 














- Exam





General: [non toxic], [no distress], [appears at stated age]


Derm: [warm], [dry]


Head: [atraumatic], [normocephalic], [symmetric]


Eyes: [EOMI], [no lid lag], [anicteric sclera]


Mouth: [no lip lesion], [mucus membranes moist]


Cardiovascular: [S1S2 reg], [no murmur], [positive posterior tibial pulse 

bilateral], 


Lungs: [CTA bilateral], [no rhonchi, no rales] , [no accessory muscle use]


Abdominal: [soft], [ nontender to palpation], [no guarding], [no appreciable 

organomegaly]


Ext: [no gross muscle atrophy], [no edema], [no contractures], right shoulder wr

apped in casted with dressing clean dry and intact, with 2+ radial pulse


Neuro: [ CN II-XI grossly intact], [no focal neuro deficits]


Psych: [Alert], [oriented], [appropriate affect] 





- Labs


CBC & Chem 7: 


                                 07/08/20 09:15





                                 07/07/20 12:54


Labs: 


                  Abnormal Lab Results - Last 24 Hours (Table)











  07/07/20 07/08/20 07/08/20 Range/Units





  21:01 07:16 09:15 


 


WBC    12.1 H  (3.8-10.6)  k/uL


 


RBC    3.74 L  (3.80-5.40)  m/uL


 


Hgb    10.7 L  (11.4-16.0)  gm/dL


 


Hct    33.4 L  (34.0-46.0)  %


 


Neutrophils #    9.4 H  (1.3-7.7)  k/uL


 


POC Glucose (mg/dL)  142 H  120 H   (75-99)  mg/dL














Assessment and Plan


Assessment: 





Leukocytosis


Hypothyroidism


GERD


Right shoulder numbness related to recent right shoulder total arthroplasty





Patient has a mild leukocytosis of 12.1 which is likely reactive from surgery.  

There are no signs of infection.  Continue Synthroid for hypothyroidism.  

Continue Prilosec for history of GERD.  Her numbness has resolved.  She is 

medically cleared for discharge home.

## 2020-07-08 NOTE — P.DS
Providers


Date of admission: 


07/07/20 12:15





Expected date of discharge: 07/08/20


Attending physician: 


Momo Engel





Consults: 





                                        





07/07/20 15:26


Consult Physician Routine 


   Consulting Provider: Terri Barron


   Consult Reason/Comments: Medical Management


   Do you want consulting provider notified?: Yes











Primary care physician: 


Stated None





Hospital Course: 





Date of admission: 07/07/2020


Date of discharge: 07/08/2020





Admission diagnosis: Status post right total shoulder arthroplasty


Discharge diagnosis: Same





Attending physician: Dr. Engel





Surgical procedures: Right total shoulder arthroplasty





Brief history: Patient is a 85-year-old female with a history of progressive 

primary right shoulder osteoarthritis.  At this point patient has failed 

conservative treatment measures and has opted to proceed with a elective right 

total shoulder arthroplasty.





Hospital course: Details of patient's surgery can be found in operative report. 

Patient tolerated the procedure well and was subsequently transported to 

orthopedic floor.  Patient's orthopeidc and medical care was provided daily. 

Patient had daily laboratory tests performed for evaluation of overall blood 

counts.  Patient had daily physical therapy to include strengthening range of 

motion as well as education with walker ambulation. Patient was treated with 

aspirin for their postoperative DVT prophylaxis during their inpatient stay.  

Patient was noted to have a relatively uneventful postoperative course.  Patient

reported satisfactory pain control with oral pain medications by postoperative 

day 0.  Patient showed satisfactory progress with physical therapy.  Patient 

moved steadily through the program and had no difficulty meeting the goals by 

postoperative day 1.  Given patient's otherwise satisfactory course and having 

met physical therapy goals, plan is to discharge patient home on postoperative 

day 1.





Discharge condition/disposition: Patient will be discharged home in stable 

condition.





Discharge medications: Instructions are given on resumption of patient's normal 

daily medications per primary care recommendation, in addition patient will be 

prescribed Tramadol 50 mg, aspirin 81 mg.





Discharge instructions:


1.  Wound care and infection precautions, keep incision dry and covered while 

showering], no lotions, creams, moisturizers. No soaking, tubs, pools, hottubs. 

Do not scrub over the incision.


2.  Utilize arm sling, keep incision clean and dry while showering


3.  Ice and elevate when necessary.  Do not exceed 20 minutes per hour with ice 

pack.


4.  Utilize compression sleeve until seen at first follow up appointment.


7.  Pain meds and anticoagulants per prescription.


8.  Pain medication has potential to cause constipation. Increase oral fluid and

fiber intake. Contact primary care provider if you have not had a bowel movement

within 48 hours after discharge


9.  No anti-inflammatory medication until discussed at first post operative 

visit, this including Motrin, Aleve, Mobic, Diclofenac


10.  Follow up in office at 2 weeks postop with Benja Guzman PA-C


11. Follow up with your primary care doctor 7-10 days after discharge.


12. Contact [facility] with any questions, [phone].











Procedures: 





Right total shoulder arthroplasty


Patient Condition at Discharge: Good





Plan - Discharge Summary


Discharge Rx Participant: Yes


New Discharge Prescriptions: 


New


   Aspirin [Adult Low Dose Aspirin EC] 81 mg PO BID #60 tablet.


   traMADol HCl [Ultram] 50 mg PO Q6H PRN #28 tab


     PRN Reason: Pain





No Action


   Levothyroxine Sodium [Synthroid] 50 mcg PO DAILY


   Omeprazole [PriLOSEC] 20 mg PO DAILY


Discharge Medication List





Levothyroxine Sodium [Synthroid] 50 mcg PO DAILY 11/08/17 [History]


Omeprazole [PriLOSEC] 20 mg PO DAILY 07/01/20 [History]


Aspirin [Adult Low Dose Aspirin EC] 81 mg PO BID #60 tablet. 07/08/20 [Rx]


traMADol HCl [Ultram] 50 mg PO Q6H PRN #28 tab 07/08/20 [Rx]








Follow up Appointment(s)/Referral(s): 


Mahendra Guzman PAC [PHYSICIAN ASSISTANT] - 07/22/20 3:10 pm


Activity/Diet/Wound Care/Special Instructions: 


Orthopedic discharge instructions:


1.  Resume home medications after discharge


2.  Pain medication as needed


3.  Utilize arm sling


4.  Daily dressing changes, keep incision dry and covered while showering


5.  Plan for follow-up at [facility] in 2 weeks


Discharge Disposition: HOME WITH HOME HEALTH SERVICES

## 2020-07-10 VITALS
SYSTOLIC BLOOD PRESSURE: 132 MMHG | RESPIRATION RATE: 16 BRPM | DIASTOLIC BLOOD PRESSURE: 77 MMHG | HEART RATE: 84 BPM | TEMPERATURE: 98.4 F

## 2021-03-29 ENCOUNTER — HOSPITAL ENCOUNTER (INPATIENT)
Dept: HOSPITAL 47 - EC | Age: 86
LOS: 3 days | Discharge: NURSING, REHAB OR LONG-TERM CARE | DRG: 179 | End: 2021-04-01
Attending: FAMILY MEDICINE | Admitting: FAMILY MEDICINE
Payer: MEDICARE

## 2021-03-29 DIAGNOSIS — E78.5: ICD-10-CM

## 2021-03-29 DIAGNOSIS — Z79.890: ICD-10-CM

## 2021-03-29 DIAGNOSIS — Z79.82: ICD-10-CM

## 2021-03-29 DIAGNOSIS — R55: ICD-10-CM

## 2021-03-29 DIAGNOSIS — Z96.612: ICD-10-CM

## 2021-03-29 DIAGNOSIS — R29.6: ICD-10-CM

## 2021-03-29 DIAGNOSIS — U07.1: Primary | ICD-10-CM

## 2021-03-29 DIAGNOSIS — M19.90: ICD-10-CM

## 2021-03-29 DIAGNOSIS — I69.334: ICD-10-CM

## 2021-03-29 DIAGNOSIS — E03.9: ICD-10-CM

## 2021-03-29 DIAGNOSIS — Z96.641: ICD-10-CM

## 2021-03-29 LAB
ALBUMIN SERPL-MCNC: 3.3 G/DL (ref 3.5–5)
ALP SERPL-CCNC: 76 U/L (ref 38–126)
ALT SERPL-CCNC: 18 U/L (ref 4–34)
ANION GAP SERPL CALC-SCNC: 5 MMOL/L
APTT BLD: 23.7 SEC (ref 22–30)
AST SERPL-CCNC: 25 U/L (ref 14–36)
BASOPHILS # BLD AUTO: 0 K/UL (ref 0–0.2)
BASOPHILS NFR BLD AUTO: 1 %
BUN SERPL-SCNC: 15 MG/DL (ref 7–17)
CALCIUM SPEC-MCNC: 8.9 MG/DL (ref 8.4–10.2)
CHLORIDE SERPL-SCNC: 106 MMOL/L (ref 98–107)
CO2 SERPL-SCNC: 25 MMOL/L (ref 22–30)
EOSINOPHIL # BLD AUTO: 0 K/UL (ref 0–0.7)
EOSINOPHIL NFR BLD AUTO: 0 %
ERYTHROCYTE [DISTWIDTH] IN BLOOD BY AUTOMATED COUNT: 4.21 M/UL (ref 3.8–5.4)
ERYTHROCYTE [DISTWIDTH] IN BLOOD: 18.2 % (ref 11.5–15.5)
GLUCOSE SERPL-MCNC: 125 MG/DL (ref 74–99)
HCT VFR BLD AUTO: 35.4 % (ref 34–46)
HGB BLD-MCNC: 11.7 GM/DL (ref 11.4–16)
HYALINE CASTS UR QL AUTO: 3 /LPF (ref 0–2)
INR PPP: 1 (ref ?–1.2)
LYMPHOCYTES # SPEC AUTO: 1.3 K/UL (ref 1–4.8)
LYMPHOCYTES NFR SPEC AUTO: 27 %
MCH RBC QN AUTO: 27.7 PG (ref 25–35)
MCHC RBC AUTO-ENTMCNC: 33 G/DL (ref 31–37)
MCV RBC AUTO: 84.1 FL (ref 80–100)
MONOCYTES # BLD AUTO: 0.6 K/UL (ref 0–1)
MONOCYTES NFR BLD AUTO: 11 %
NEUTROPHILS # BLD AUTO: 2.7 K/UL (ref 1.3–7.7)
NEUTROPHILS NFR BLD AUTO: 56 %
PH UR: 5.5 [PH] (ref 5–8)
PLATELET # BLD AUTO: 237 K/UL (ref 150–450)
POTASSIUM SERPL-SCNC: 4 MMOL/L (ref 3.5–5.1)
PROT SERPL-MCNC: 5.7 G/DL (ref 6.3–8.2)
PT BLD: 10.5 SEC (ref 9–12)
RBC UR QL: 4 /HPF (ref 0–5)
SODIUM SERPL-SCNC: 136 MMOL/L (ref 137–145)
SP GR UR: 1.02 (ref 1–1.03)
SQUAMOUS UR QL AUTO: 1 /HPF (ref 0–4)
UROBILINOGEN UR QL STRIP: <2 MG/DL (ref ?–2)
WBC # BLD AUTO: 4.8 K/UL (ref 3.8–10.6)
WBC # UR AUTO: 7 /HPF (ref 0–5)

## 2021-03-29 PROCEDURE — 87635 SARS-COV-2 COVID-19 AMP PRB: CPT

## 2021-03-29 PROCEDURE — 70450 CT HEAD/BRAIN W/O DYE: CPT

## 2021-03-29 PROCEDURE — 93005 ELECTROCARDIOGRAM TRACING: CPT

## 2021-03-29 PROCEDURE — 83605 ASSAY OF LACTIC ACID: CPT

## 2021-03-29 PROCEDURE — 84484 ASSAY OF TROPONIN QUANT: CPT

## 2021-03-29 PROCEDURE — 93306 TTE W/DOPPLER COMPLETE: CPT

## 2021-03-29 PROCEDURE — 85610 PROTHROMBIN TIME: CPT

## 2021-03-29 PROCEDURE — 80053 COMPREHEN METABOLIC PANEL: CPT

## 2021-03-29 PROCEDURE — 84443 ASSAY THYROID STIM HORMONE: CPT

## 2021-03-29 PROCEDURE — 80048 BASIC METABOLIC PNL TOTAL CA: CPT

## 2021-03-29 PROCEDURE — 36415 COLL VENOUS BLD VENIPUNCTURE: CPT

## 2021-03-29 PROCEDURE — 72125 CT NECK SPINE W/O DYE: CPT

## 2021-03-29 PROCEDURE — 99285 EMERGENCY DEPT VISIT HI MDM: CPT

## 2021-03-29 PROCEDURE — 85025 COMPLETE CBC W/AUTO DIFF WBC: CPT

## 2021-03-29 PROCEDURE — 81001 URINALYSIS AUTO W/SCOPE: CPT

## 2021-03-29 PROCEDURE — 85730 THROMBOPLASTIN TIME PARTIAL: CPT

## 2021-03-29 RX ADMIN — ASPIRIN 81 MG CHEWABLE TABLET SCH MG: 81 TABLET CHEWABLE at 21:40

## 2021-03-29 RX ADMIN — Medication SCH MG: at 21:40

## 2021-03-29 RX ADMIN — OXYCODONE HYDROCHLORIDE AND ACETAMINOPHEN SCH MG: 500 TABLET ORAL at 21:40

## 2021-03-29 RX ADMIN — ATORVASTATIN CALCIUM SCH MG: 40 TABLET, FILM COATED ORAL at 21:40

## 2021-03-29 RX ADMIN — METOPROLOL TARTRATE SCH MG: 50 TABLET, FILM COATED ORAL at 21:40
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
## 2021-03-29 NOTE — CT
EXAMINATION TYPE: CT brain cspine wo con

 

DATE OF EXAM: 3/29/2021

 

COMPARISON: None

 

HISTORY: Fall; syncope; head injury 

 

 

Unenhanced CT of the brain was performed.  

 

The ventricles, basal cisterns and sulci overlying the cerebral convexities demonstrate moderate enla
rgement.  

 

There is no evidence for intracranial hemorrhage or sulcal effacement.  There is decreased attenuatio
n about the periventricular white matter and deep white matter of both cerebral hemispheres, compatib
le with chronic small vessel ischemia.  

 

No mass effects are seen.  

If symptoms persist consider MRI.  

 

Osseous calvarium is intact.

 

IMPRESSION:

 

1.  Age related atrophic and chronic small vessel ischemic change without acute intracranial process 
seen at this time.

 

CT Cervical Spine:

 

Unenhanced CT of the cervical spine was performed with bone and soft tissue window settings submitted
.  Coronal and sagittal reconstruction is obtained.  

 

There is normal alignment and prevertebral soft tissues. No evidence for acute cervical fracture .   
Scattered degenerative disc disease and spondylosis. Biapical scarring.   

 

IMPRESSION:

 

1.  No evidence for acute fracture or subluxation of the cervical spine.

## 2021-03-30 RX ADMIN — DOCUSATE SODIUM SCH MG: 100 CAPSULE, LIQUID FILLED ORAL at 08:24

## 2021-03-30 RX ADMIN — PANTOPRAZOLE SODIUM SCH MG: 40 TABLET, DELAYED RELEASE ORAL at 08:24

## 2021-03-30 RX ADMIN — ATORVASTATIN CALCIUM SCH MG: 40 TABLET, FILM COATED ORAL at 20:18

## 2021-03-30 RX ADMIN — DONEPEZIL HYDROCHLORIDE SCH MG: 10 TABLET ORAL at 08:24

## 2021-03-30 RX ADMIN — Medication SCH MCG: at 08:24

## 2021-03-30 RX ADMIN — Medication SCH MG: at 08:24

## 2021-03-30 RX ADMIN — METOPROLOL TARTRATE SCH MG: 50 TABLET, FILM COATED ORAL at 08:24

## 2021-03-30 RX ADMIN — OXYCODONE HYDROCHLORIDE AND ACETAMINOPHEN SCH MG: 500 TABLET ORAL at 08:24

## 2021-03-30 RX ADMIN — ASPIRIN 81 MG CHEWABLE TABLET SCH MG: 81 TABLET CHEWABLE at 08:24

## 2021-03-30 RX ADMIN — OXYCODONE HYDROCHLORIDE AND ACETAMINOPHEN SCH MG: 500 TABLET ORAL at 20:18

## 2021-03-30 RX ADMIN — LEVOTHYROXINE SODIUM SCH MCG: 50 TABLET ORAL at 05:55

## 2021-03-30 RX ADMIN — METOPROLOL TARTRATE SCH MG: 50 TABLET, FILM COATED ORAL at 20:18

## 2021-03-30 RX ADMIN — ASPIRIN 81 MG CHEWABLE TABLET SCH MG: 81 TABLET CHEWABLE at 20:18
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
## 2021-03-30 NOTE — ECHOF
Referral Reason:syncope



MEASUREMENTS

--------

HEIGHT: 182.9 cm

WEIGHT: 83.9 kg

BP: 

RVIDd:   2.8 cm     (< 3.3)

IVSd:   1.2 cm     (0.6 - 1.1)

LVIDd:   3.6 cm     (3.9 - 5.3)

LVPWd:   1.1 cm     (0.6 - 1.1)

IVSs:   1.3 cm

LVIDs:   2.8 cm

LVPWs:   1.2 cm

LA Diam:   3.3 cm     (2.7 - 3.8)

Ao Diam:   3.3 cm     (2.0 - 3.7)

AV Cusp:   2.1 cm     (1.5 - 2.6)

MV E Brett:   0.46 m/s

MV DecT:   250 ms

MV A Brett:   0.54 m/s

MV E/A Ratio:   0.86 

RAP:   5.00 mmHg

RVSP:   31.77 mmHg







FINDINGS

--------

Sinus rhythm.

Pt is postivie for Covid.

The left ventricular size is normal.   There is mild concentric left ventricular hypertrophy.   Overa
ll left ventricular systolic function is normal with, an EF between 55 - 60 %.

The right ventricle is normal in size.

The left atrial size is normal.

The right atrial size is normal.

5.0mg OF Lumason UTLIZED: 2 OR MORE WALL SEGMENTS NOT VISUALIZED.

There is mild aortic valve sclerosis.   There is no evidence of aortic regurgitation.

Mild mitral regurgitation is present.

The tricuspid valve appears structurally normal.   No regurgitation noted

The pulmonic valve was not well visualized.

The aortic root size is normal.

There is no pericardial effusion.



CONCLUSIONS

--------

1. Pt is postivie for Covid.

2. The left ventricular size is normal.

3. There is mild concentric left ventricular hypertrophy.

4. Overall left ventricular systolic function is normal with, an EF between 55 - 60 %.

5. The right ventricle is normal in size.

6. The left atrial size is normal.

7. The right atrial size is normal.

8. 5.0mg OF Lumason UTLIZED: 2 OR MORE WALL SEGMENTS NOT VISUALIZED.

9. There is mild aortic valve sclerosis.

10. Mild mitral regurgitation is present.

11. The tricuspid valve appears structurally normal.

12. The pulmonic valve was not well visualized.

13. The aortic root size is normal.

14. There is no pericardial effusion.





SONOGRAPHER: Lety Knox RDCS

## 2021-03-31 LAB
ANION GAP SERPL CALC-SCNC: 5 MMOL/L (ref 4–12)
BUN SERPL-SCNC: 16 MG/DL (ref 9–27)
BUN/CREAT SERPL: 17.78 RATIO (ref 12–20)
CALCIUM SPEC-MCNC: 8.4 MG/DL (ref 8.7–10.3)
CHLORIDE SERPL-SCNC: 105 MMOL/L (ref 96–109)
CO2 SERPL-SCNC: 26 MMOL/L (ref 21.6–31.8)
GLUCOSE BLD-MCNC: 117 MG/DL (ref 75–99)
GLUCOSE BLD-MCNC: 144 MG/DL (ref 75–99)
GLUCOSE SERPL-MCNC: 130 MG/DL (ref 70–110)
POTASSIUM SERPL-SCNC: 4.6 MMOL/L (ref 3.5–5.5)
SODIUM SERPL-SCNC: 136 MMOL/L (ref 135–145)

## 2021-03-31 RX ADMIN — OXYCODONE HYDROCHLORIDE AND ACETAMINOPHEN SCH MG: 500 TABLET ORAL at 10:14

## 2021-03-31 RX ADMIN — INSULIN ASPART SCH: 100 INJECTION, SOLUTION INTRAVENOUS; SUBCUTANEOUS at 20:53

## 2021-03-31 RX ADMIN — LEVOTHYROXINE SODIUM SCH MCG: 50 TABLET ORAL at 05:29

## 2021-03-31 RX ADMIN — ATORVASTATIN CALCIUM SCH MG: 40 TABLET, FILM COATED ORAL at 20:30

## 2021-03-31 RX ADMIN — DONEPEZIL HYDROCHLORIDE SCH MG: 10 TABLET ORAL at 10:14

## 2021-03-31 RX ADMIN — DEXTROSE SCH MG: 50 INJECTION, SOLUTION INTRAVENOUS at 17:48

## 2021-03-31 RX ADMIN — DOCUSATE SODIUM SCH MG: 100 CAPSULE, LIQUID FILLED ORAL at 10:14

## 2021-03-31 RX ADMIN — OXYCODONE HYDROCHLORIDE AND ACETAMINOPHEN SCH MG: 500 TABLET ORAL at 20:30

## 2021-03-31 RX ADMIN — Medication SCH MG: at 10:13

## 2021-03-31 RX ADMIN — INSULIN ASPART SCH: 100 INJECTION, SOLUTION INTRAVENOUS; SUBCUTANEOUS at 18:13

## 2021-03-31 RX ADMIN — Medication SCH MCG: at 10:13

## 2021-03-31 RX ADMIN — ASPIRIN 81 MG CHEWABLE TABLET SCH MG: 81 TABLET CHEWABLE at 10:13

## 2021-03-31 RX ADMIN — PANTOPRAZOLE SODIUM SCH MG: 40 TABLET, DELAYED RELEASE ORAL at 10:13

## 2021-03-31 RX ADMIN — METOPROLOL TARTRATE SCH MG: 50 TABLET, FILM COATED ORAL at 10:14

## 2021-03-31 RX ADMIN — ASPIRIN 81 MG CHEWABLE TABLET SCH MG: 81 TABLET CHEWABLE at 20:30

## 2021-03-31 NOTE — P.PN
Subjective


Progress Note Date: 03/31/21








Felisa Underwood is an 86 yo F with PMH of CVA, hypothyroidism who presented 

to the ED complaining of weakness and dizziness over the past few days. She 

notes she was recently discharged from rehab and since then has been at home. 

She complains of episodes where she will become dizzy and then collapse, unsure 

if she passed out. She denies any fever or shortness of breath, endorses cough. 

On presentation vitals stable, labs with sodium 136, COVID positive. CT 

head/cervical spine with chronic changes.














03/31/2021-containing O2 sats of 92% on room air, nonproductive cough.  Reports 

tolerated ambulation with PT yesterday well with no lightheadedness dizziness or

focal deficits.  Evaluated by cardiology and patient is scheduled for 

echocardiogram this morning.  VSS,Telemetry sinus rhythm. Denies chest pain, 

palpitations.

















Objective





- Vital Signs


Vital signs: 


                                   Vital Signs











Temp  98.1 F   03/31/21 07:00


 


Pulse  80   03/31/21 07:00


 


Resp  18   03/31/21 07:00


 


BP  137/81   03/31/21 07:00


 


Pulse Ox  94 L  03/31/21 07:00








                                 Intake & Output











 03/30/21 03/31/21 03/31/21





 18:59 06:59 18:59


 


Intake Total   100


 


Balance   100


 


Intake:   


 


  Oral   100


 


Other:   


 


  Voiding Method Bedside Commode Bedside Commode 


 


  # Voids 1 1 1


 


  # Bowel Movements   1














- Exam


General: well nourished, well developed, NAD. Vitals reviewed


Eyes: PERRL, EOMI, conjunctiva normal


HENT: normocephalic, mucus membranes moist


Neck: supple, no JVD


Lungs: normal respiratory effort, no wheezes or rales


CV: Regular rate and rhythm, no murmur. Peripheral pulses 2+


Abdomen: soft, nondistended, no organomegaly, positive bowel sounds


Skin: warm and dry.


Neuro: A&Ox3, normal mood and affect








- Labs


CBC & Chem 7: 


                                 03/29/21 10:23





                                 03/31/21 05:14


Labs: 


                  Abnormal Lab Results - Last 24 Hours (Table)











  03/31/21 Range/Units





  05:14 


 


Est GFR (CKD-EPI)NonAf  58.7 L  (60.0-200.0)   


 


Glucose  130 H  ()  mg/dL


 


Calcium  8.4 L  (8.7-10.3)  mg/dL














Assessment and Plan


Assessment: 


(1) Syncope and collapse secondary to Covid infection


Current Visit: Yes   Status: Acute   Code(s): R55 - SYNCOPE AND COLLAPSE   

SNOMED Code(s): 103612915


   





(2) COVID-19


Current Visit: Yes   Status: Acute   Code(s): U07.1 - COVID-19   SNOMED Code(s):

525329423


   





(3) Hypothyroid


Current Visit: Yes   Status: Acute   Code(s): E03.9 - HYPOTHYROIDISM, 

UNSPECIFIED   SNOMED Code(s): 18756860


   


(4) history of CVA























Plan: Continue on current medication regime ,monitoring and symptomatic 

treatment.  Dexamethasone IV push added to Covid regimen.  Tolerated PT well, 

continue with daily PT.  Echo pending. 














The impression and plan of care has been dictated as directed.





:


I performed a history and examination of this patient,  discussed the same with 

the dictator.  I agree with the dictator's note ,documented as a scribe.  Any 

additional findings or plans will be noted.

## 2021-03-31 NOTE — P.PN
Subjective


HISTORY OF PRESENTING ILLNESS


This is a pleasant 85-year-old female past medical history significant for GERD,

CVA with chronic left upper extremity weakness, hyperlipidemia. She does not 

follow in the office with a cardiologist. We have been asked to see in 

consultation for syncope.  Patient was recently discharged from rehab and 

presents to the emergency department for evaluation of multiple falls in the 

last couple days.  Yesterday she became dizzy and fell  is unable to 

safely care for at home.  Patient denies any chest pain, shortness of breath, 

nausea, vomiting, abdominal pain, palpitations, lightheadedness. Denies history 

of abnormal heart rhythm, diabetes, MI or stroke. Denies tobacco or alcohol use.

 Laboratory data reviewed COVID-19 positive, troponins negative 3, sodium 136, 

potassium 4.0, creatinine 0.85, BUNs 15, liver enzymes within normal limits. 

Vital signs blood pressure 129/76, heart rate 64, maintaining oxygen saturation 

on room air, afebrile.





EKG reveals sinus bradycardia HR 59 with TWI in lead III, no significant ST-T 

wave abnormalities. No prior EKGs to compare 


Telemetry tracings indicate sinus rhythm 


CT head/cervical spine- age-related atrophic and chronic small vessel ischemic 

change.  No acute intracranial process seen. No acute fracture or sublaxation of

the cervical spine 


Current cardiac medications include aspirin 81 mg, metoprolol titrate 50 mg 

twice a day, atorvastatin 40 mg daily. 





3/31/21:


Patient seen an examined at bedside, resting comfortably, no acute distress. BP 

137/81 HR 80, afebrile, maintaining oxygen saturations on room air. 

Echocardiogram reveals left ventricular systolic function is normal with EF 

between 5560 percent, mild MR. Telemetry tracings reveal normal sinus rhythm, 

no irregular rhythms noted. 





REVIEW OF SYSTEMS


At the time of my exam:


CONSTITUTIONAL: Denies fever or chills.


CARDIOVASCULAR: Denies chest pain, shortness of breath, orthopnea, PND or pa

lpitations.


RESPIRATORY: Denies cough. 


GASTROINTESTINAL: Denies abdominal pain, diarrhea, constipation, nausea or 

vomiting.


MUSCULOSKELETAL: Denies myalgias.


NEUROLOGIC: Denies numbness, tingling, headacbe or weakness.


ENDOCRINE: Denies fatigue, weight change,  polydipsia or polyurina.


GENITOURINARY: Denies burning, hematuria or urgency with micturation.


HEMATOLOGIC: Denies history of anemia or bleeding. 





PHYSICAL EXAMINATION


Thorough physical examination not complete due to COVID-19 infection





ASSESSMENT


Syncope- unclear etiology 


COVID-19 infection


History CVA 


Hyperlipidemia 





PLAN


-2D echo with normal LV systolic function, no acute abnormalities.


-Will change metoprolol tartrate to 50mg in the morning, and metoprolol tartrate

25mg nightly. 


-Continue aspirin and atrovastatin. 


-Will will sign off at this time, please reach out for any other questions or 

concerns. 


-Patient can follow up in the office with Dr. Summers outpatient. 





Nurse Practitioner note has been reviewed, I agree with a documented findings 

and plan of care.  Patient was seen and examined.








Objective





- Vital Signs


Vital signs: 


                                   Vital Signs











Temp  98.1 F   03/31/21 07:00


 


Pulse  80   03/31/21 07:00


 


Resp  18   03/31/21 07:00


 


BP  137/81   03/31/21 07:00


 


Pulse Ox  94 L  03/31/21 07:00








                                 Intake & Output











 03/30/21 03/31/21 03/31/21





 18:59 06:59 18:59


 


Other:   


 


  Voiding Method Bedside Commode Bedside Commode 


 


  # Voids 1 1 1


 


  # Bowel Movements   1














- Labs


CBC & Chem 7: 


                                 03/29/21 10:23





                                 03/31/21 05:14


Labs: 


                  Abnormal Lab Results - Last 24 Hours (Table)











  03/31/21 Range/Units





  05:14 


 


Est GFR (CKD-EPI)NonAf  58.7 L  (60.0-200.0)   


 


Glucose  130 H  ()  mg/dL


 


Calcium  8.4 L  (8.7-10.3)  mg/dL

## 2021-04-01 VITALS
DIASTOLIC BLOOD PRESSURE: 79 MMHG | RESPIRATION RATE: 18 BRPM | TEMPERATURE: 97 F | HEART RATE: 70 BPM | SYSTOLIC BLOOD PRESSURE: 118 MMHG

## 2021-04-01 LAB
ANION GAP SERPL CALC-SCNC: 11.2 MMOL/L (ref 4–12)
BUN SERPL-SCNC: 15 MG/DL (ref 9–27)
BUN/CREAT SERPL: 18.75 RATIO (ref 12–20)
CALCIUM SPEC-MCNC: 9.3 MG/DL (ref 8.7–10.3)
CHLORIDE SERPL-SCNC: 102 MMOL/L (ref 96–109)
CO2 SERPL-SCNC: 23.8 MMOL/L (ref 21.6–31.8)
GLUCOSE BLD-MCNC: 150 MG/DL (ref 75–99)
GLUCOSE BLD-MCNC: 156 MG/DL (ref 75–99)
GLUCOSE SERPL-MCNC: 150 MG/DL (ref 70–110)
POTASSIUM SERPL-SCNC: 5.2 MMOL/L (ref 3.5–5.5)
SODIUM SERPL-SCNC: 137 MMOL/L (ref 135–145)

## 2021-04-01 RX ADMIN — Medication SCH MCG: at 09:24

## 2021-04-01 RX ADMIN — DEXTROSE SCH MG: 50 INJECTION, SOLUTION INTRAVENOUS at 09:24

## 2021-04-01 RX ADMIN — PANTOPRAZOLE SODIUM SCH MG: 40 TABLET, DELAYED RELEASE ORAL at 09:24

## 2021-04-01 RX ADMIN — Medication SCH MG: at 09:24

## 2021-04-01 RX ADMIN — DONEPEZIL HYDROCHLORIDE SCH MG: 10 TABLET ORAL at 09:24

## 2021-04-01 RX ADMIN — DOCUSATE SODIUM SCH MG: 100 CAPSULE, LIQUID FILLED ORAL at 09:24

## 2021-04-01 RX ADMIN — ASPIRIN 81 MG CHEWABLE TABLET SCH MG: 81 TABLET CHEWABLE at 09:24

## 2021-04-01 RX ADMIN — INSULIN ASPART SCH: 100 INJECTION, SOLUTION INTRAVENOUS; SUBCUTANEOUS at 09:20

## 2021-04-01 RX ADMIN — LEVOTHYROXINE SODIUM SCH MCG: 50 TABLET ORAL at 05:55

## 2021-04-01 RX ADMIN — OXYCODONE HYDROCHLORIDE AND ACETAMINOPHEN SCH MG: 500 TABLET ORAL at 09:24

## 2021-04-01 NOTE — P.DS
Providers


Date of admission: 


03/31/21 14:55





Expected date of discharge: 04/01/21


Attending physician: 


Miller Briceno MD





Consults: 





                                        





03/29/21 12:42


Consult Physician Routine 


   Consulting Provider: [facility]


   Consult Reason/Comments: Syncope


   Do you want consulting provider notified?: Yes











Primary care physician: 


Bhavya Briceno





[facility] Hospital Course: 


Final Diagnoses:


(1) Syncope and collapse secondary to Covid infection


Current Visit: Yes   Status: Acute   Code(s): R55 - SYNCOPE AND COLLAPSE   

SNOMED Code(s): 454727167


   





(2) COVID-19


Current Visit: Yes   Status: Acute   Code(s): U07.1 - COVID-19   SNOMED Code(s):

185258515


   





(3) Hypothyroid


Current Visit: Yes   Status: Acute   Code(s): E03.9 - HYPOTHYROIDISM, 

UNSPECIFIED   SNOMED Code(s): 56098781


   


(4) history of CVA

















Hospital course:Felisa Underwood is an 84 yo F with PMH of CVA, 

hypothyroidism who presented to the ED complaining of weakness and dizziness 

over the past few days. She notes she was recently discharged from rehab and 

since then has been at home. She complains of episodes where she will become 

dizzy and then collapse, unsure if she passed out. She denies any fever or 

shortness of breath, endorses cough. On presentation vitals stable, labs with so

dium 136, COVID positive. CT head/cervical spine with chronic changes.














03/31/2021-containing O2 sats of 92% on room air, nonproductive cough.  Reports 

tolerated ambulation with PT yesterday well with no lightheadedness dizziness or

focal deficits.  Evaluated by cardiology and patient is scheduled for 

echocardiogram this morning.  VSS,Telemetry sinus rhythm. Denies chest pain, 

palpitations.











Significant clinical improvement.  Maintained on Covid regimen , including 

vitamin supplements.Reports improvement with cough with the addition of 

Decadron.  Evaluated by cardiology, 2-D echo reported normal LV function.  

Metoprolol tartrate further adjusted to 50 mg in the morning and 25 mg at night.

 Cleared by cardiology for discharge.  Patient will be discharged to [facility] 

subacute rehab today in a stable condition with guarded prognosis.











The impression and plan of care has been dictated as directed.





:


I performed a history and examination of this patient,  discussed the same with 

the dictator.  I agree with the dictator's note ,documented as a scribe.  Any 

additional findings or plans will be noted.


Patient Condition at Discharge: Stable





Plan - Discharge Summary


New Discharge Prescriptions: 


New


   Metoprolol Tartrate [Lopressor] 50 mg PO DAILY  tab


   Metoprolol Tartrate [Lopressor] 25 mg PO Q24H 30 Days #30 tab


   Ascorbic Acid [Vitamin C] 500 mg PO BID  tab


   Cholecalciferol [Vitamin D3 (25 Mcg = 1000 Iu)] 25 mcg PO DAILY  tablet


   dexAMETHasone [Hexadrol] See Taper PO DAILY #18 tab


   INSULIN LISPRO (HumaLOG) [humaLOG] 0 unit SQ ACHS #1 vial


   Zinc Sulfate [Orazinc] 220 mg PO DAILY  cap





Continue


   Levothyroxine Sodium [Synthroid] 50 mcg PO DAILY


   Omeprazole [PriLOSEC] 20 mg PO DAILY


   Aspirin [Adult Low Dose Aspirin EC] 81 mg PO BID #60 tablet.


   Ferrous Sulfate [Iron (65 MG Elemental)] 325 mg PO DAILY


   Atorvastatin [Lipitor] 40 mg PO DAILY


   Docusate [Colace] 100 mg PO DAILY


   Donepezil [Aricept] 10 mg PO DAILY





Discontinued


   Metoprolol Tartrate [Lopressor] 50 mg PO BID


Discharge Medication List





Levothyroxine Sodium [Synthroid] 50 mcg PO DAILY 11/08/17 [History]


Omeprazole [PriLOSEC] 20 mg PO DAILY 07/01/20 [History]


Aspirin [Adult Low Dose Aspirin EC] 81 mg PO BID #60 tablet. 07/08/20 [Rx]


Atorvastatin [Lipitor] 40 mg PO DAILY 03/29/21 [History]


Docusate [Colace] 100 mg PO DAILY 03/29/21 [History]


Donepezil [Aricept] 10 mg PO DAILY 03/29/21 [History]


Ferrous Sulfate [Iron (65 MG Elemental)] 325 mg PO DAILY 03/29/21 [History]


Metoprolol Tartrate [Lopressor] 25 mg PO Q24H 30 Days #30 tab 03/31/21 [Rx]


Metoprolol Tartrate [Lopressor] 50 mg PO DAILY  tab 03/31/21 [Rx]


Ascorbic Acid [Vitamin C] 500 mg PO BID  tab 04/01/21 [Rx]


Cholecalciferol [Vitamin D3 (25 Mcg = 1000 Iu)] 25 mcg PO DAILY  tablet 04/01/21

[Rx]


INSULIN LISPRO (HumaLOG) [humaLOG] 0 unit SQ ACHS #1 vial 04/01/21 [Rx]


Zinc Sulfate [Orazinc] 220 mg PO DAILY  cap 04/01/21 [Rx]


dexAMETHasone [Hexadrol] See Taper PO DAILY #18 tab 04/01/21 [Rx]








Follow up Appointment(s)/Referral(s): 


Carolina Summers MD [STAFF PHYSICIAN] - 3 Weeks


Bhavya Briceno DO [Primary Care Provider] - 1 Week (after dc from subacute rehab.)


Activity/Diet/Wound Care/Special Instructions: 


RUPAL Ke[place] in 3 Days


Discharge Disposition: TRANSFER TO SNF/ECF

## 2025-04-11 ENCOUNTER — HOSPITAL ENCOUNTER (OUTPATIENT)
Dept: HOSPITAL 47 - EC | Age: 89
Setting detail: OBSERVATION
LOS: 4 days | Discharge: NURSING, REHAB OR LONG-TERM CARE | End: 2025-04-15
Attending: HOSPITALIST | Admitting: HOSPITALIST
Payer: MEDICARE

## 2025-04-11 VITALS — BODY MASS INDEX: 27.4 KG/M2

## 2025-04-11 DIAGNOSIS — Z86.73: ICD-10-CM

## 2025-04-11 DIAGNOSIS — Z79.1: ICD-10-CM

## 2025-04-11 DIAGNOSIS — Z88.5: ICD-10-CM

## 2025-04-11 DIAGNOSIS — Z79.899: ICD-10-CM

## 2025-04-11 DIAGNOSIS — F03.90: ICD-10-CM

## 2025-04-11 DIAGNOSIS — Z91.040: ICD-10-CM

## 2025-04-11 DIAGNOSIS — Z79.82: ICD-10-CM

## 2025-04-11 DIAGNOSIS — R19.7: ICD-10-CM

## 2025-04-11 DIAGNOSIS — I10: ICD-10-CM

## 2025-04-11 DIAGNOSIS — R73.03: ICD-10-CM

## 2025-04-11 DIAGNOSIS — Z88.0: ICD-10-CM

## 2025-04-11 DIAGNOSIS — E03.9: ICD-10-CM

## 2025-04-11 DIAGNOSIS — K21.9: ICD-10-CM

## 2025-04-11 DIAGNOSIS — Z11.52: ICD-10-CM

## 2025-04-11 DIAGNOSIS — E78.5: ICD-10-CM

## 2025-04-11 DIAGNOSIS — E87.20: ICD-10-CM

## 2025-04-11 DIAGNOSIS — A41.9: Primary | ICD-10-CM

## 2025-04-11 DIAGNOSIS — Z79.890: ICD-10-CM

## 2025-04-11 LAB
ALBUMIN SERPL-MCNC: 3.6 G/DL (ref 3.5–5)
ALP SERPL-CCNC: 97 U/L (ref 38–126)
ALT SERPL-CCNC: 20 U/L (ref 4–34)
AMYLASE SERPL-CCNC: 50 U/L (ref 30–110)
ANION GAP SERPL CALC-SCNC: 9 MMOL/L
AST SERPL-CCNC: 25 U/L (ref 14–36)
BASOPHILS # BLD AUTO: 0.05 10*3/UL (ref 0–0.1)
BASOPHILS NFR BLD AUTO: 0.2 %
BUN SERPL-SCNC: 21 MG/DL (ref 7–17)
CALCIUM SPEC-MCNC: 8.8 MG/DL (ref 8.4–10.2)
CHLORIDE SERPL-SCNC: 107 MMOL/L (ref 98–107)
CO2 SERPL-SCNC: 22 MMOL/L (ref 22–30)
ERYTHROCYTE [DISTWIDTH] IN BLOOD BY AUTOMATED COUNT: 4.37 10*6/UL (ref 4.1–5.2)
ERYTHROCYTE [DISTWIDTH] IN BLOOD: 13.4 % (ref 11.5–14.5)
GLUCOSE SERPL-MCNC: 134 MG/DL (ref 74–99)
LIPASE SERPL-CCNC: 197 U/L (ref 23–300)
LYMPHOCYTES # SPEC AUTO: 0.62 10*3/UL (ref 0.9–5)
LYMPHOCYTES NFR SPEC AUTO: 2.7 %
MCHC RBC AUTO-ENTMCNC: 33.3 G/DL (ref 32–37)
MCV RBC AUTO: 96.1 FL (ref 80–97)
MONOCYTES # BLD AUTO: 1.29 10*3/UL (ref 0.2–1)
MONOCYTES NFR BLD AUTO: 5.7 %
NEUTROPHILS # BLD AUTO: 20.53 10*3/UL (ref 1.8–7.7)
NEUTROPHILS NFR BLD AUTO: 90.8 %
PLATELET # BLD AUTO: 226 10*3/UL (ref 140–440)
POTASSIUM SERPL-SCNC: 4.4 MMOL/L (ref 3.5–5.1)
PROT SERPL-MCNC: 5.9 G/DL (ref 6.3–8.2)
RBC UR QL: 7 /HPF (ref 0–5)
SODIUM SERPL-SCNC: 138 MMOL/L (ref 137–145)
SP GR UR: 1.01 (ref 1–1.03)
SQUAMOUS UR QL AUTO: 2 /HPF (ref 0–4)
UROBILINOGEN UR QL STRIP: <2 MG/DL (ref ?–2)
WBC # BLD AUTO: 22.62 10*3/UL (ref 4.5–10)
WBC # UR AUTO: 4 /HPF (ref 0–5)

## 2025-04-11 PROCEDURE — 80053 COMPREHEN METABOLIC PANEL: CPT

## 2025-04-11 PROCEDURE — 83605 ASSAY OF LACTIC ACID: CPT

## 2025-04-11 PROCEDURE — 80048 BASIC METABOLIC PNL TOTAL CA: CPT

## 2025-04-11 PROCEDURE — 71046 X-RAY EXAM CHEST 2 VIEWS: CPT

## 2025-04-11 PROCEDURE — 85025 COMPLETE CBC W/AUTO DIFF WBC: CPT

## 2025-04-11 PROCEDURE — 81001 URINALYSIS AUTO W/SCOPE: CPT

## 2025-04-11 PROCEDURE — 99285 EMERGENCY DEPT VISIT HI MDM: CPT

## 2025-04-11 PROCEDURE — 36415 COLL VENOUS BLD VENIPUNCTURE: CPT

## 2025-04-11 PROCEDURE — 87636 SARSCOV2 & INF A&B AMP PRB: CPT

## 2025-04-11 PROCEDURE — 93005 ELECTROCARDIOGRAM TRACING: CPT

## 2025-04-11 PROCEDURE — 82150 ASSAY OF AMYLASE: CPT

## 2025-04-11 PROCEDURE — 84145 PROCALCITONIN (PCT): CPT

## 2025-04-11 PROCEDURE — 70450 CT HEAD/BRAIN W/O DYE: CPT

## 2025-04-11 PROCEDURE — 83690 ASSAY OF LIPASE: CPT

## 2025-04-11 PROCEDURE — 87040 BLOOD CULTURE FOR BACTERIA: CPT

## 2025-04-11 RX ADMIN — CEFAZOLIN STA MLS/HR: 330 INJECTION, POWDER, FOR SOLUTION INTRAMUSCULAR; INTRAVENOUS at 18:44

## 2025-04-11 RX ADMIN — VANCOMYCIN HYDROCHLORIDE SCH MG: 125 CAPSULE ORAL at 16:52

## 2025-04-11 RX ADMIN — CEFAZOLIN ONE MLS/HR: 330 INJECTION, POWDER, FOR SOLUTION INTRAMUSCULAR; INTRAVENOUS at 16:54

## 2025-04-13 LAB
ANION GAP SERPL CALC-SCNC: 4 MMOL/L
BASOPHILS # BLD AUTO: 0.06 10*3/UL (ref 0–0.1)
BASOPHILS NFR BLD AUTO: 0.4 %
BUN SERPL-SCNC: 19 MG/DL (ref 7–17)
CALCIUM SPEC-MCNC: 8.6 MG/DL (ref 8.4–10.2)
CHLORIDE SERPL-SCNC: 107 MMOL/L (ref 98–107)
CO2 SERPL-SCNC: 24 MMOL/L (ref 22–30)
EOSINOPHIL # BLD AUTO: 0.21 10*3/UL (ref 0.04–0.35)
EOSINOPHIL NFR BLD AUTO: 1.5 %
ERYTHROCYTE [DISTWIDTH] IN BLOOD BY AUTOMATED COUNT: 3.44 10*6/UL (ref 4.1–5.2)
ERYTHROCYTE [DISTWIDTH] IN BLOOD: 14.1 % (ref 11.5–14.5)
GLUCOSE SERPL-MCNC: 101 MG/DL (ref 74–99)
HCT VFR BLD AUTO: 33.7 % (ref 37.2–46.3)
LYMPHOCYTES # SPEC AUTO: 2.08 10*3/UL (ref 0.9–5)
LYMPHOCYTES NFR SPEC AUTO: 14.6 %
MCHC RBC AUTO-ENTMCNC: 32.6 G/DL (ref 32–37)
MONOCYTES # BLD AUTO: 1.05 10*3/UL (ref 0.2–1)
MONOCYTES NFR BLD AUTO: 7.4 %
NEUTROPHILS # BLD AUTO: 10.81 10*3/UL (ref 1.8–7.7)
NEUTROPHILS NFR BLD AUTO: 75.6 %
PLATELET # BLD AUTO: 197 10*3/UL (ref 140–440)
POTASSIUM SERPL-SCNC: 3.8 MMOL/L (ref 3.5–5.1)
SODIUM SERPL-SCNC: 135 MMOL/L (ref 137–145)
WBC # BLD AUTO: 14.28 10*3/UL (ref 4.5–10)

## 2025-04-13 RX ADMIN — ATORVASTATIN CALCIUM SCH MG: 40 TABLET, FILM COATED ORAL at 20:28

## 2025-04-13 RX ADMIN — DONEPEZIL HYDROCHLORIDE SCH MG: 10 TABLET ORAL at 08:38

## 2025-04-13 RX ADMIN — LEVOTHYROXINE SODIUM SCH MCG: 50 TABLET ORAL at 08:38

## 2025-04-13 RX ADMIN — METOPROLOL TARTRATE SCH MG: 50 TABLET, FILM COATED ORAL at 08:38

## 2025-04-13 RX ADMIN — ASPIRIN 81 MG CHEWABLE TABLET SCH MG: 81 TABLET CHEWABLE at 08:38

## 2025-04-13 RX ADMIN — METOPROLOL TARTRATE SCH MG: 25 TABLET, FILM COATED ORAL at 17:42

## 2025-04-14 LAB
BASOPHILS # BLD AUTO: 0.06 X 10*3/UL (ref 0–0.1)
BASOPHILS NFR BLD AUTO: 0.6 %
BUN/CREAT SERPL: 22.86 RATIO (ref 12–20)
CALCIUM SPEC-MCNC: 8.7 MG/DL (ref 8.7–10.3)
CHLORIDE SERPL-SCNC: 108 MMOL/L (ref 96–109)
CO2 SERPL-SCNC: 21.6 MMOL/L (ref 21.6–31.8)
EOSINOPHIL NFR BLD AUTO: 1.9 %
ERYTHROCYTE [DISTWIDTH] IN BLOOD: 13.7 % (ref 11.5–14.5)
GLUCOSE SERPL-MCNC: 103 MG/DL (ref 70–110)
HCT VFR BLD AUTO: 37.7 % (ref 37.2–46.3)
HGB BLD-MCNC: 11.8 G/DL (ref 12–15)
LYMPHOCYTES NFR SPEC AUTO: 20.1 %
MCH RBC QN AUTO: 31.1 PG (ref 27–32)
MCHC RBC AUTO-ENTMCNC: 31.3 G/DL (ref 32–37)
MCV RBC AUTO: 99.2 FL (ref 80–97)
MONOCYTES # BLD AUTO: 0.89 X 10*3/UL (ref 0.2–1)
MONOCYTES NFR BLD AUTO: 8.5 %
NEUTROPHILS # BLD AUTO: 7.16 X 10*3/UL (ref 1.8–7.7)
NEUTROPHILS NFR BLD AUTO: 68.5 %
NRBC BLD AUTO-RTO: 0 X 10*3/UL (ref 0–0.01)
PLATELET # BLD AUTO: 207 X 10*3/UL (ref 140–440)
POTASSIUM SERPL-SCNC: 3.6 MMOL/L (ref 3.5–5.5)
SODIUM SERPL-SCNC: 141 MMOL/L (ref 135–145)
WBC # BLD AUTO: 10.45 X 10*3/UL (ref 4.5–10)

## 2025-04-15 VITALS — SYSTOLIC BLOOD PRESSURE: 167 MMHG | DIASTOLIC BLOOD PRESSURE: 85 MMHG | HEART RATE: 65 BPM

## 2025-04-15 VITALS — TEMPERATURE: 97.8 F | RESPIRATION RATE: 16 BRPM

## 2025-04-27 ENCOUNTER — HOSPITAL ENCOUNTER (INPATIENT)
Dept: HOSPITAL 47 - EC | Age: 89
LOS: 2 days | Discharge: NURSING, REHAB OR LONG-TERM CARE | DRG: 282 | End: 2025-04-29
Attending: INTERNAL MEDICINE | Admitting: INTERNAL MEDICINE
Payer: MEDICARE

## 2025-04-27 DIAGNOSIS — I10: ICD-10-CM

## 2025-04-27 DIAGNOSIS — D72.829: ICD-10-CM

## 2025-04-27 DIAGNOSIS — E80.7: ICD-10-CM

## 2025-04-27 DIAGNOSIS — Z88.5: ICD-10-CM

## 2025-04-27 DIAGNOSIS — Z79.899: ICD-10-CM

## 2025-04-27 DIAGNOSIS — Z88.0: ICD-10-CM

## 2025-04-27 DIAGNOSIS — I21.4: Primary | ICD-10-CM

## 2025-04-27 DIAGNOSIS — Z86.73: ICD-10-CM

## 2025-04-27 DIAGNOSIS — R55: ICD-10-CM

## 2025-04-27 DIAGNOSIS — I95.9: ICD-10-CM

## 2025-04-27 DIAGNOSIS — T68.XXXA: ICD-10-CM

## 2025-04-27 DIAGNOSIS — E78.5: ICD-10-CM

## 2025-04-27 DIAGNOSIS — Z79.82: ICD-10-CM

## 2025-04-27 DIAGNOSIS — E03.9: ICD-10-CM

## 2025-04-27 DIAGNOSIS — Z96.612: ICD-10-CM

## 2025-04-27 DIAGNOSIS — Z79.1: ICD-10-CM

## 2025-04-27 DIAGNOSIS — Z79.890: ICD-10-CM

## 2025-04-27 DIAGNOSIS — F03.90: ICD-10-CM

## 2025-04-27 LAB
ALBUMIN SERPL-MCNC: 3.8 G/DL (ref 3.5–5)
ALP SERPL-CCNC: 95 U/L (ref 38–126)
ALT SERPL-CCNC: 16 U/L (ref 4–34)
ANION GAP SERPL CALC-SCNC: 8 MMOL/L
AST SERPL-CCNC: 20 U/L (ref 14–36)
BASOPHILS # BLD AUTO: 0.05 10*3/UL (ref 0–0.1)
BASOPHILS NFR BLD AUTO: 0.4 %
BUN SERPL-SCNC: 21 MG/DL (ref 7–17)
CALCIUM SPEC-MCNC: 9.6 MG/DL (ref 8.4–10.2)
CHLORIDE SERPL-SCNC: 105 MMOL/L (ref 98–107)
CO2 SERPL-SCNC: 26 MMOL/L (ref 22–30)
EOSINOPHIL # BLD AUTO: 0.04 10*3/UL (ref 0.04–0.35)
EOSINOPHIL NFR BLD AUTO: 0.3 %
ERYTHROCYTE [DISTWIDTH] IN BLOOD BY AUTOMATED COUNT: 4.27 10*6/UL (ref 4.1–5.2)
ERYTHROCYTE [DISTWIDTH] IN BLOOD: 13.7 % (ref 11.5–14.5)
GLUCOSE BLD-MCNC: 143 MG/DL (ref 70–110)
GLUCOSE SERPL-MCNC: 146 MG/DL (ref 74–99)
HCT VFR BLD AUTO: 40.9 % (ref 37.2–46.3)
HGB BLD-MCNC: 13.2 G/DL (ref 12–15)
LYMPHOCYTES # SPEC AUTO: 1.32 10*3/UL (ref 0.9–5)
LYMPHOCYTES NFR SPEC AUTO: 11.1 %
MCH RBC QN AUTO: 30.9 PG (ref 27–32)
MCHC RBC AUTO-ENTMCNC: 32.3 G/DL (ref 32–37)
MCV RBC AUTO: 95.8 FL (ref 80–97)
MONOCYTES # BLD AUTO: 0.78 10*3/UL (ref 0.2–1)
MONOCYTES NFR BLD AUTO: 6.6 %
NEUTROPHILS # BLD AUTO: 9.61 10*3/UL (ref 1.8–7.7)
NEUTROPHILS NFR BLD AUTO: 81.3 %
PLATELET # BLD AUTO: 298 10*3/UL (ref 140–440)
PROT SERPL-MCNC: 6.2 G/DL (ref 6.3–8.2)
SODIUM SERPL-SCNC: 139 MMOL/L (ref 137–145)
WBC # BLD AUTO: 11.84 10*3/UL (ref 4.5–10)

## 2025-04-27 PROCEDURE — 84443 ASSAY THYROID STIM HORMONE: CPT

## 2025-04-27 PROCEDURE — 93005 ELECTROCARDIOGRAM TRACING: CPT

## 2025-04-27 PROCEDURE — 71046 X-RAY EXAM CHEST 2 VIEWS: CPT

## 2025-04-27 PROCEDURE — 85610 PROTHROMBIN TIME: CPT

## 2025-04-27 PROCEDURE — 80048 BASIC METABOLIC PNL TOTAL CA: CPT

## 2025-04-27 PROCEDURE — 93308 TTE F-UP OR LMTD: CPT

## 2025-04-27 PROCEDURE — 83605 ASSAY OF LACTIC ACID: CPT

## 2025-04-27 PROCEDURE — 85730 THROMBOPLASTIN TIME PARTIAL: CPT

## 2025-04-27 PROCEDURE — 83036 HEMOGLOBIN GLYCOSYLATED A1C: CPT

## 2025-04-27 PROCEDURE — 80061 LIPID PANEL: CPT

## 2025-04-27 PROCEDURE — 85027 COMPLETE CBC AUTOMATED: CPT

## 2025-04-27 PROCEDURE — 76705 ECHO EXAM OF ABDOMEN: CPT

## 2025-04-27 PROCEDURE — 36415 COLL VENOUS BLD VENIPUNCTURE: CPT

## 2025-04-27 PROCEDURE — 85025 COMPLETE CBC W/AUTO DIFF WBC: CPT

## 2025-04-27 PROCEDURE — 96374 THER/PROPH/DIAG INJ IV PUSH: CPT

## 2025-04-27 PROCEDURE — 80053 COMPREHEN METABOLIC PANEL: CPT

## 2025-04-27 PROCEDURE — 84484 ASSAY OF TROPONIN QUANT: CPT

## 2025-04-27 PROCEDURE — 99285 EMERGENCY DEPT VISIT HI MDM: CPT

## 2025-04-27 RX ADMIN — HEPARIN SODIUM ONE UNIT: 1000 INJECTION, SOLUTION INTRAVENOUS; SUBCUTANEOUS at 14:56

## 2025-04-27 RX ADMIN — CEFAZOLIN ONE MLS/HR: 330 INJECTION, POWDER, FOR SOLUTION INTRAMUSCULAR; INTRAVENOUS at 11:18

## 2025-04-27 RX ADMIN — ASPIRIN 325 MG ORAL TABLET STA MG: 325 PILL ORAL at 14:57

## 2025-04-27 RX ADMIN — HEPARIN SODIUM SCH MLS/HR: 10000 INJECTION, SOLUTION INTRAVENOUS at 14:56

## 2025-04-28 VITALS — TEMPERATURE: 98.1 F

## 2025-04-28 LAB
ANION GAP SERPL CALC-SCNC: 6 MMOL/L
APTT BLD: 57.5 SEC (ref 22–30)
BASOPHILS # BLD AUTO: 0.08 10*3/UL (ref 0–0.1)
BASOPHILS NFR BLD AUTO: 0.9 %
BUN SERPL-SCNC: 20 MG/DL (ref 7–17)
CALCIUM SPEC-MCNC: 8.9 MG/DL (ref 8.4–10.2)
CHLORIDE SERPL-SCNC: 106 MMOL/L (ref 98–107)
CO2 SERPL-SCNC: 25 MMOL/L (ref 22–30)
EOSINOPHIL # BLD AUTO: 0.12 10*3/UL (ref 0.04–0.35)
EOSINOPHIL NFR BLD AUTO: 1.4 %
ERYTHROCYTE [DISTWIDTH] IN BLOOD BY AUTOMATED COUNT: 3.96 10*6/UL (ref 4.1–5.2)
ERYTHROCYTE [DISTWIDTH] IN BLOOD: 13.9 % (ref 11.5–14.5)
GLUCOSE SERPL-MCNC: 111 MG/DL (ref 74–99)
HCT VFR BLD AUTO: 38.4 % (ref 37.2–46.3)
HGB BLD-MCNC: 12.2 G/DL (ref 12–15)
INR PPP: 1.1 (ref ?–1.2)
LDLC SERPL CALC-MCNC: 61.2 MG/DL (ref 0–131)
LYMPHOCYTES # SPEC AUTO: 2.02 10*3/UL (ref 0.9–5)
LYMPHOCYTES NFR SPEC AUTO: 23.7 %
MCH RBC QN AUTO: 30.8 PG (ref 27–32)
MCHC RBC AUTO-ENTMCNC: 31.8 G/DL (ref 32–37)
MONOCYTES # BLD AUTO: 0.89 10*3/UL (ref 0.2–1)
MONOCYTES NFR BLD AUTO: 10.5 %
NEUTROPHILS # BLD AUTO: 5.39 10*3/UL (ref 1.8–7.7)
NEUTROPHILS NFR BLD AUTO: 63.4 %
PLATELET # BLD AUTO: 245 10*3/UL (ref 140–440)
POTASSIUM SERPL-SCNC: 4.4 MMOL/L (ref 3.5–5.1)
PT BLD: 12.1 SEC (ref 10–12.5)
SODIUM SERPL-SCNC: 137 MMOL/L (ref 137–145)
WBC # BLD AUTO: 8.51 10*3/UL (ref 4.5–10)

## 2025-04-28 RX ADMIN — DONEPEZIL HYDROCHLORIDE SCH MG: 10 TABLET ORAL at 09:37

## 2025-04-28 RX ADMIN — ATORVASTATIN CALCIUM SCH MG: 40 TABLET, FILM COATED ORAL at 19:43

## 2025-04-28 RX ADMIN — Medication SCH MG: at 17:15

## 2025-04-28 RX ADMIN — LEVOTHYROXINE SODIUM SCH MCG: 50 TABLET ORAL at 09:37

## 2025-04-28 RX ADMIN — METOPROLOL TARTRATE SCH MG: 25 TABLET, FILM COATED ORAL at 17:15

## 2025-04-29 VITALS — DIASTOLIC BLOOD PRESSURE: 77 MMHG | RESPIRATION RATE: 18 BRPM | SYSTOLIC BLOOD PRESSURE: 136 MMHG | HEART RATE: 65 BPM

## 2025-04-29 LAB
ALBUMIN SERPL-MCNC: 3.1 G/DL (ref 3.5–5)
ALP SERPL-CCNC: 92 U/L (ref 38–126)
ALT SERPL-CCNC: 13 U/L (ref 4–34)
ANION GAP SERPL CALC-SCNC: 8 MMOL/L
AST SERPL-CCNC: 17 U/L (ref 14–36)
BUN SERPL-SCNC: 19 MG/DL (ref 7–17)
CALCIUM SPEC-MCNC: 8.6 MG/DL (ref 8.4–10.2)
CHLORIDE SERPL-SCNC: 106 MMOL/L (ref 98–107)
CO2 SERPL-SCNC: 23 MMOL/L (ref 22–30)
ERYTHROCYTE [DISTWIDTH] IN BLOOD BY AUTOMATED COUNT: 3.66 10*6/UL (ref 4.1–5.2)
ERYTHROCYTE [DISTWIDTH] IN BLOOD: 13.9 % (ref 11.5–14.5)
GLUCOSE SERPL-MCNC: 101 MG/DL (ref 74–99)
HCT VFR BLD AUTO: 35.3 % (ref 37.2–46.3)
HGB BLD-MCNC: 11.3 G/DL (ref 12–15)
MCH RBC QN AUTO: 30.9 PG (ref 27–32)
MCV RBC AUTO: 96.4 FL (ref 80–97)
PLATELET # BLD AUTO: 247 10*3/UL (ref 140–440)
POTASSIUM SERPL-SCNC: 4.1 MMOL/L (ref 3.5–5.1)
PROT SERPL-MCNC: 5.2 G/DL (ref 6.3–8.2)
SODIUM SERPL-SCNC: 137 MMOL/L (ref 137–145)
WBC # BLD AUTO: 7.52 10*3/UL (ref 4.5–10)

## 2025-04-29 RX ADMIN — MELOXICAM SCH MG: 7.5 TABLET ORAL at 08:15

## 2025-04-29 RX ADMIN — ASPIRIN 81 MG CHEWABLE TABLET SCH MG: 81 TABLET CHEWABLE at 08:15

## 2025-04-29 RX ADMIN — METOPROLOL TARTRATE SCH MG: 50 TABLET, FILM COATED ORAL at 08:15

## 2025-06-26 ENCOUNTER — HOSPITAL ENCOUNTER (EMERGENCY)
Dept: HOSPITAL 47 - EC | Age: 89
Discharge: HOME | End: 2025-06-26
Payer: MEDICARE

## 2025-06-26 VITALS — RESPIRATION RATE: 18 BRPM | SYSTOLIC BLOOD PRESSURE: 123 MMHG | DIASTOLIC BLOOD PRESSURE: 84 MMHG | HEART RATE: 78 BPM

## 2025-06-26 VITALS — TEMPERATURE: 97.7 F

## 2025-06-26 DIAGNOSIS — Z88.0: ICD-10-CM

## 2025-06-26 DIAGNOSIS — I82.412: Primary | ICD-10-CM

## 2025-06-26 DIAGNOSIS — Z91.040: ICD-10-CM

## 2025-06-26 DIAGNOSIS — Z88.8: ICD-10-CM

## 2025-06-26 PROCEDURE — 99283 EMERGENCY DEPT VISIT LOW MDM: CPT
